# Patient Record
Sex: MALE | Race: WHITE | Employment: FULL TIME | URBAN - METROPOLITAN AREA
[De-identification: names, ages, dates, MRNs, and addresses within clinical notes are randomized per-mention and may not be internally consistent; named-entity substitution may affect disease eponyms.]

---

## 2017-02-27 ENCOUNTER — OFFICE VISIT (OUTPATIENT)
Dept: FAMILY MEDICINE CLINIC | Age: 62
End: 2017-02-27

## 2017-02-27 VITALS
BODY MASS INDEX: 25.77 KG/M2 | SYSTOLIC BLOOD PRESSURE: 116 MMHG | HEART RATE: 79 BPM | DIASTOLIC BLOOD PRESSURE: 70 MMHG | WEIGHT: 190 LBS

## 2017-02-27 DIAGNOSIS — Z78.9 STATIN INTOLERANCE: ICD-10-CM

## 2017-02-27 DIAGNOSIS — E78.2 MIXED HYPERLIPIDEMIA: Primary | ICD-10-CM

## 2017-02-27 DIAGNOSIS — E11.9 TYPE 2 DIABETES MELLITUS WITHOUT COMPLICATION, WITHOUT LONG-TERM CURRENT USE OF INSULIN (HCC): ICD-10-CM

## 2017-02-27 DIAGNOSIS — I10 ESSENTIAL HYPERTENSION: ICD-10-CM

## 2017-02-27 LAB
ALBUMIN SERPL-MCNC: 4.7 G/DL (ref 3.4–5)
ANION GAP SERPL CALCULATED.3IONS-SCNC: 19 MMOL/L (ref 3–16)
BUN BLDV-MCNC: 16 MG/DL (ref 7–20)
CALCIUM SERPL-MCNC: 9.9 MG/DL (ref 8.3–10.6)
CHLORIDE BLD-SCNC: 93 MMOL/L (ref 99–110)
CHOLESTEROL, TOTAL: 311 MG/DL (ref 0–199)
CO2: 26 MMOL/L (ref 21–32)
CREAT SERPL-MCNC: 0.9 MG/DL (ref 0.8–1.3)
GFR AFRICAN AMERICAN: >60
GFR NON-AFRICAN AMERICAN: >60
GLUCOSE BLD-MCNC: 265 MG/DL (ref 70–99)
HDLC SERPL-MCNC: 56 MG/DL (ref 40–60)
LDL CHOLESTEROL CALCULATED: 209 MG/DL
PHOSPHORUS: 3.6 MG/DL (ref 2.5–4.9)
POTASSIUM SERPL-SCNC: 4.4 MMOL/L (ref 3.5–5.1)
SODIUM BLD-SCNC: 138 MMOL/L (ref 136–145)
TRIGL SERPL-MCNC: 230 MG/DL (ref 0–150)
VLDLC SERPL CALC-MCNC: 46 MG/DL

## 2017-02-27 PROCEDURE — 99213 OFFICE O/P EST LOW 20 MIN: CPT | Performed by: FAMILY MEDICINE

## 2017-02-27 RX ORDER — GLUCOSAMINE HCL/CHONDROITIN SU 500-400 MG
CAPSULE ORAL
Qty: 100 STRIP | Refills: 5 | Status: SHIPPED | OUTPATIENT
Start: 2017-02-27 | End: 2018-06-28 | Stop reason: ALTCHOICE

## 2017-02-27 RX ORDER — PIOGLITAZONEHYDROCHLORIDE 30 MG/1
30 TABLET ORAL DAILY
Qty: 30 TABLET | Refills: 5 | Status: SHIPPED | OUTPATIENT
Start: 2017-02-27 | End: 2017-08-29 | Stop reason: SDUPTHER

## 2017-02-28 LAB
ESTIMATED AVERAGE GLUCOSE: 277.6 MG/DL
HBA1C MFR BLD: 11.3 %

## 2017-04-17 RX ORDER — RIVAROXABAN 20 MG/1
TABLET, FILM COATED ORAL
Qty: 30 TABLET | Refills: 4 | Status: SHIPPED | OUTPATIENT
Start: 2017-04-17 | End: 2017-09-18 | Stop reason: SDUPTHER

## 2017-08-29 RX ORDER — PIOGLITAZONEHYDROCHLORIDE 30 MG/1
TABLET ORAL
Qty: 30 TABLET | Refills: 0 | Status: SHIPPED | OUTPATIENT
Start: 2017-08-29 | End: 2017-09-27 | Stop reason: SDUPTHER

## 2017-09-19 RX ORDER — AMLODIPINE BESYLATE 5 MG/1
TABLET ORAL
Qty: 30 TABLET | Refills: 0 | Status: SHIPPED | OUTPATIENT
Start: 2017-09-19 | End: 2017-10-18 | Stop reason: SDUPTHER

## 2017-09-19 RX ORDER — VALSARTAN AND HYDROCHLOROTHIAZIDE 320; 25 MG/1; MG/1
TABLET, FILM COATED ORAL
Qty: 30 TABLET | Refills: 0 | Status: SHIPPED | OUTPATIENT
Start: 2017-09-19 | End: 2017-10-18 | Stop reason: SDUPTHER

## 2017-09-19 RX ORDER — RIVAROXABAN 20 MG/1
TABLET, FILM COATED ORAL
Qty: 30 TABLET | Refills: 0 | Status: SHIPPED | OUTPATIENT
Start: 2017-09-19 | End: 2017-10-18 | Stop reason: SDUPTHER

## 2017-09-27 RX ORDER — PIOGLITAZONEHYDROCHLORIDE 30 MG/1
TABLET ORAL
Qty: 30 TABLET | Refills: 0 | Status: SHIPPED | OUTPATIENT
Start: 2017-09-27 | End: 2017-10-27 | Stop reason: SDUPTHER

## 2017-10-18 RX ORDER — RIVAROXABAN 20 MG/1
TABLET, FILM COATED ORAL
Qty: 30 TABLET | Refills: 0 | Status: SHIPPED | OUTPATIENT
Start: 2017-10-18 | End: 2017-11-16 | Stop reason: SDUPTHER

## 2017-10-18 RX ORDER — AMLODIPINE BESYLATE 5 MG/1
TABLET ORAL
Qty: 30 TABLET | Refills: 0 | Status: SHIPPED | OUTPATIENT
Start: 2017-10-18 | End: 2017-11-16 | Stop reason: SDUPTHER

## 2017-10-18 RX ORDER — VALSARTAN AND HYDROCHLOROTHIAZIDE 320; 25 MG/1; MG/1
TABLET, FILM COATED ORAL
Qty: 30 TABLET | Refills: 0 | Status: SHIPPED | OUTPATIENT
Start: 2017-10-18 | End: 2017-11-16 | Stop reason: SDUPTHER

## 2017-10-23 ENCOUNTER — OFFICE VISIT (OUTPATIENT)
Dept: FAMILY MEDICINE CLINIC | Age: 62
End: 2017-10-23

## 2017-10-23 VITALS
WEIGHT: 200 LBS | SYSTOLIC BLOOD PRESSURE: 132 MMHG | OXYGEN SATURATION: 98 % | HEIGHT: 72 IN | RESPIRATION RATE: 14 BRPM | HEART RATE: 80 BPM | BODY MASS INDEX: 27.09 KG/M2 | DIASTOLIC BLOOD PRESSURE: 70 MMHG

## 2017-10-23 DIAGNOSIS — Z11.59 NEED FOR HEPATITIS C SCREENING TEST: ICD-10-CM

## 2017-10-23 DIAGNOSIS — Z23 NEED FOR PROPHYLACTIC VACCINATION AGAINST STREPTOCOCCUS PNEUMONIAE (PNEUMOCOCCUS): ICD-10-CM

## 2017-10-23 DIAGNOSIS — E11.9 TYPE 2 DIABETES MELLITUS WITHOUT COMPLICATION, WITHOUT LONG-TERM CURRENT USE OF INSULIN (HCC): Primary | ICD-10-CM

## 2017-10-23 DIAGNOSIS — Z11.4 SCREENING FOR HIV WITHOUT PRESENCE OF RISK FACTORS: ICD-10-CM

## 2017-10-23 PROCEDURE — 99213 OFFICE O/P EST LOW 20 MIN: CPT | Performed by: FAMILY MEDICINE

## 2017-10-23 ASSESSMENT — PATIENT HEALTH QUESTIONNAIRE - PHQ9
2. FEELING DOWN, DEPRESSED OR HOPELESS: 0
SUM OF ALL RESPONSES TO PHQ QUESTIONS 1-9: 0
1. LITTLE INTEREST OR PLEASURE IN DOING THINGS: 0
SUM OF ALL RESPONSES TO PHQ9 QUESTIONS 1 & 2: 0

## 2017-10-23 ASSESSMENT — ENCOUNTER SYMPTOMS
GASTROINTESTINAL NEGATIVE: 1
RESPIRATORY NEGATIVE: 1

## 2017-10-23 NOTE — PROGRESS NOTES
Subjective:      Patient ID: Navya Cordero is a 62 y.o. male. [id number] through the day, has stopped drinking sodas and more salads. Still stress eats and is under a lot of stress due to his son with MD.    Wants to be a , has had no hypo reactions      Review of Systems   Eyes: Negative for visual disturbance (needs to see ophth). Respiratory: Negative. Cardiovascular: Negative. Gastrointestinal: Negative. Objective:   Physical Exam   Constitutional: He appears well-developed and well-nourished. Neck: Normal range of motion. Neck supple. Carotid bruit is not present. No thyromegaly present. Cardiovascular: Normal rate, regular rhythm and normal heart sounds. No murmur heard. Pulses:       Dorsalis pedis pulses are 0 on the right side, and 1+ on the left side. Posterior tibial pulses are 0 on the right side, and 0 on the left side. Pulmonary/Chest: Effort normal and breath sounds normal.   Abdominal: Soft. Bowel sounds are normal. He exhibits no abdominal bruit. There is no hepatosplenomegaly. There is no tenderness. Musculoskeletal: He exhibits no edema. Lymphadenopathy:     He has no cervical adenopathy. Neurological: No sensory deficit.        Assessment:    DM needs sl better control  PVD    hyperlipidemia      Plan:      Fasting labs  Increase metformin to twice daily  3 mos

## 2017-10-27 RX ORDER — PIOGLITAZONEHYDROCHLORIDE 30 MG/1
TABLET ORAL
Qty: 30 TABLET | Refills: 5 | Status: SHIPPED | OUTPATIENT
Start: 2017-10-27 | End: 2018-04-24 | Stop reason: SDUPTHER

## 2017-11-16 DIAGNOSIS — E11.9 TYPE 2 DIABETES MELLITUS WITHOUT COMPLICATION, WITHOUT LONG-TERM CURRENT USE OF INSULIN (HCC): ICD-10-CM

## 2017-11-16 DIAGNOSIS — E78.2 MIXED HYPERLIPIDEMIA: Primary | ICD-10-CM

## 2017-11-16 DIAGNOSIS — I10 ESSENTIAL HYPERTENSION: ICD-10-CM

## 2017-11-16 RX ORDER — VALSARTAN AND HYDROCHLOROTHIAZIDE 320; 25 MG/1; MG/1
TABLET, FILM COATED ORAL
Qty: 30 TABLET | Refills: 0 | Status: SHIPPED | OUTPATIENT
Start: 2017-11-16 | End: 2017-12-18 | Stop reason: SDUPTHER

## 2017-11-16 RX ORDER — RIVAROXABAN 20 MG/1
TABLET, FILM COATED ORAL
Qty: 30 TABLET | Refills: 0 | Status: SHIPPED | OUTPATIENT
Start: 2017-11-16 | End: 2017-12-18 | Stop reason: SDUPTHER

## 2017-11-16 RX ORDER — AMLODIPINE BESYLATE 5 MG/1
TABLET ORAL
Qty: 30 TABLET | Refills: 0 | Status: SHIPPED | OUTPATIENT
Start: 2017-11-16 | End: 2017-12-18 | Stop reason: SDUPTHER

## 2017-11-16 NOTE — TELEPHONE ENCOUNTER
Pt was seen on 10.23.17, it was documented for Fasting labs to be done, they were not ordered at that time. Please place order.  Pt also needs Hgb A1C done last one in Feb 17 was 11.3

## 2017-12-01 LAB
ALBUMIN SERPL-MCNC: 4.4 G/DL (ref 3.4–5)
ANION GAP SERPL CALCULATED.3IONS-SCNC: 17 MMOL/L (ref 3–16)
BUN BLDV-MCNC: 20 MG/DL (ref 7–20)
CALCIUM SERPL-MCNC: 9.7 MG/DL (ref 8.3–10.6)
CHLORIDE BLD-SCNC: 97 MMOL/L (ref 99–110)
CHOLESTEROL, TOTAL: 274 MG/DL (ref 0–199)
CO2: 25 MMOL/L (ref 21–32)
CREAT SERPL-MCNC: 1 MG/DL (ref 0.8–1.3)
GFR AFRICAN AMERICAN: >60
GFR NON-AFRICAN AMERICAN: >60
GLUCOSE BLD-MCNC: 185 MG/DL (ref 70–99)
HDLC SERPL-MCNC: 53 MG/DL (ref 40–60)
LDL CHOLESTEROL CALCULATED: 180 MG/DL
PHOSPHORUS: 3.4 MG/DL (ref 2.5–4.9)
POTASSIUM SERPL-SCNC: 4.2 MMOL/L (ref 3.5–5.1)
SODIUM BLD-SCNC: 139 MMOL/L (ref 136–145)
TRIGL SERPL-MCNC: 207 MG/DL (ref 0–150)
VLDLC SERPL CALC-MCNC: 41 MG/DL

## 2017-12-02 LAB
ESTIMATED AVERAGE GLUCOSE: 168.6 MG/DL
HBA1C MFR BLD: 7.5 %

## 2017-12-06 RX ORDER — COLESEVELAM 180 1/1
1250 TABLET ORAL 2 TIMES DAILY WITH MEALS
Qty: 180 TABLET | Refills: 5 | Status: SHIPPED | OUTPATIENT
Start: 2017-12-06 | End: 2017-12-08 | Stop reason: SDUPTHER

## 2017-12-07 ENCOUNTER — TELEPHONE (OUTPATIENT)
Dept: FAMILY MEDICINE CLINIC | Age: 62
End: 2017-12-07

## 2017-12-08 RX ORDER — COLESEVELAM 180 1/1
1250 TABLET ORAL 2 TIMES DAILY WITH MEALS
Qty: 360 TABLET | Refills: 3 | Status: SHIPPED | OUTPATIENT
Start: 2017-12-08 | End: 2019-02-20

## 2017-12-18 RX ORDER — AMLODIPINE BESYLATE 5 MG/1
5 TABLET ORAL DAILY
Qty: 30 TABLET | Refills: 3 | Status: SHIPPED | OUTPATIENT
Start: 2017-12-18 | End: 2018-06-28 | Stop reason: SDUPTHER

## 2017-12-18 RX ORDER — VALSARTAN AND HYDROCHLOROTHIAZIDE 320; 25 MG/1; MG/1
1 TABLET, FILM COATED ORAL DAILY
Qty: 30 TABLET | Refills: 3 | Status: SHIPPED | OUTPATIENT
Start: 2017-12-18 | End: 2018-04-16 | Stop reason: SDUPTHER

## 2017-12-20 RX ORDER — AMLODIPINE BESYLATE 5 MG/1
TABLET ORAL
Qty: 30 TABLET | Refills: 5 | Status: SHIPPED | OUTPATIENT
Start: 2017-12-20 | End: 2018-06-28 | Stop reason: SDUPTHER

## 2018-04-16 RX ORDER — VALSARTAN AND HYDROCHLOROTHIAZIDE 320; 25 MG/1; MG/1
TABLET, FILM COATED ORAL
Qty: 30 TABLET | Refills: 0 | Status: SHIPPED | OUTPATIENT
Start: 2018-04-16 | End: 2018-05-15 | Stop reason: SDUPTHER

## 2018-04-17 RX ORDER — RIVAROXABAN 20 MG/1
TABLET, FILM COATED ORAL
Qty: 30 TABLET | Refills: 0 | Status: SHIPPED | OUTPATIENT
Start: 2018-04-17 | End: 2018-05-17 | Stop reason: SDUPTHER

## 2018-04-24 RX ORDER — PIOGLITAZONEHYDROCHLORIDE 30 MG/1
TABLET ORAL
Qty: 30 TABLET | Refills: 0 | Status: SHIPPED | OUTPATIENT
Start: 2018-04-24 | End: 2018-05-22 | Stop reason: SDUPTHER

## 2018-05-15 RX ORDER — VALSARTAN AND HYDROCHLOROTHIAZIDE 320; 25 MG/1; MG/1
TABLET, FILM COATED ORAL
Qty: 30 TABLET | Refills: 0 | Status: SHIPPED | OUTPATIENT
Start: 2018-05-15 | End: 2018-06-15 | Stop reason: SDUPTHER

## 2018-05-18 RX ORDER — RIVAROXABAN 20 MG/1
TABLET, FILM COATED ORAL
Qty: 30 TABLET | Refills: 0 | Status: SHIPPED | OUTPATIENT
Start: 2018-05-18 | End: 2018-06-15 | Stop reason: SDUPTHER

## 2018-06-15 RX ORDER — VALSARTAN AND HYDROCHLOROTHIAZIDE 320; 25 MG/1; MG/1
TABLET, FILM COATED ORAL
Qty: 30 TABLET | Refills: 0 | Status: SHIPPED | OUTPATIENT
Start: 2018-06-15 | End: 2018-07-15 | Stop reason: SDUPTHER

## 2018-06-28 ENCOUNTER — OFFICE VISIT (OUTPATIENT)
Dept: FAMILY MEDICINE CLINIC | Age: 63
End: 2018-06-28

## 2018-06-28 VITALS
BODY MASS INDEX: 26.85 KG/M2 | DIASTOLIC BLOOD PRESSURE: 78 MMHG | HEART RATE: 83 BPM | SYSTOLIC BLOOD PRESSURE: 130 MMHG | WEIGHT: 198 LBS

## 2018-06-28 DIAGNOSIS — E78.2 MIXED HYPERLIPIDEMIA: ICD-10-CM

## 2018-06-28 DIAGNOSIS — E11.9 TYPE 2 DIABETES MELLITUS WITHOUT COMPLICATION, WITHOUT LONG-TERM CURRENT USE OF INSULIN (HCC): Primary | ICD-10-CM

## 2018-06-28 DIAGNOSIS — Z78.9 STATIN INTOLERANCE: ICD-10-CM

## 2018-06-28 PROCEDURE — 99214 OFFICE O/P EST MOD 30 MIN: CPT | Performed by: FAMILY MEDICINE

## 2018-06-28 RX ORDER — GLUCOSAMINE HCL/CHONDROITIN SU 500-400 MG
CAPSULE ORAL
Qty: 100 STRIP | Refills: 5 | Status: SHIPPED | OUTPATIENT
Start: 2018-06-28 | End: 2019-09-23

## 2018-06-28 RX ORDER — AMLODIPINE BESYLATE 5 MG/1
5 TABLET ORAL DAILY
Qty: 30 TABLET | Refills: 5 | Status: SHIPPED | OUTPATIENT
Start: 2018-06-28 | End: 2019-01-11 | Stop reason: SDUPTHER

## 2018-07-23 RX ORDER — PIOGLITAZONEHYDROCHLORIDE 30 MG/1
TABLET ORAL
Qty: 30 TABLET | Refills: 3 | Status: SHIPPED | OUTPATIENT
Start: 2018-07-23 | End: 2018-11-19 | Stop reason: SDUPTHER

## 2018-07-24 DIAGNOSIS — E11.9 TYPE 2 DIABETES MELLITUS WITHOUT COMPLICATION, WITHOUT LONG-TERM CURRENT USE OF INSULIN (HCC): ICD-10-CM

## 2018-07-24 DIAGNOSIS — E78.2 MIXED HYPERLIPIDEMIA: ICD-10-CM

## 2018-07-24 LAB
ANION GAP SERPL CALCULATED.3IONS-SCNC: 16 MMOL/L (ref 3–16)
BUN BLDV-MCNC: 19 MG/DL (ref 7–20)
CALCIUM SERPL-MCNC: 9.2 MG/DL (ref 8.3–10.6)
CHLORIDE BLD-SCNC: 101 MMOL/L (ref 99–110)
CHOLESTEROL, TOTAL: 203 MG/DL (ref 0–199)
CO2: 24 MMOL/L (ref 21–32)
CREAT SERPL-MCNC: 1.1 MG/DL (ref 0.8–1.3)
GFR AFRICAN AMERICAN: >60
GFR NON-AFRICAN AMERICAN: >60
GLUCOSE BLD-MCNC: 156 MG/DL (ref 70–99)
HDLC SERPL-MCNC: 57 MG/DL (ref 40–60)
LDL CHOLESTEROL CALCULATED: 121 MG/DL
POTASSIUM SERPL-SCNC: 4.3 MMOL/L (ref 3.5–5.1)
SODIUM BLD-SCNC: 141 MMOL/L (ref 136–145)
TRIGL SERPL-MCNC: 127 MG/DL (ref 0–150)
VLDLC SERPL CALC-MCNC: 25 MG/DL

## 2018-07-25 LAB
ESTIMATED AVERAGE GLUCOSE: 174.3 MG/DL
HBA1C MFR BLD: 7.7 %

## 2018-11-19 RX ORDER — PIOGLITAZONEHYDROCHLORIDE 30 MG/1
TABLET ORAL
Qty: 30 TABLET | Refills: 2 | Status: SHIPPED | OUTPATIENT
Start: 2018-11-19 | End: 2019-02-17 | Stop reason: SDUPTHER

## 2018-12-06 NOTE — TELEPHONE ENCOUNTER
Pt states he received a call from his pharmacy that his prescriptionvalsartan-hydrochlorothiazide (DIOVAN-HCT) 320-25 MG per  has been recall  Please review

## 2018-12-07 RX ORDER — OLMESARTAN MEDOXOMIL AND HYDROCHLOROTHIAZIDE 40/25 40; 25 MG/1; MG/1
1 TABLET ORAL DAILY
Qty: 30 TABLET | Refills: 0 | Status: SHIPPED | OUTPATIENT
Start: 2018-12-07 | End: 2019-02-20

## 2018-12-07 NOTE — TELEPHONE ENCOUNTER
I will change him to something else.  He will need a blood pressure check in 2-3 weeks in the office with me

## 2019-02-09 RX ORDER — VALSARTAN AND HYDROCHLOROTHIAZIDE 320; 25 MG/1; MG/1
TABLET, FILM COATED ORAL
Qty: 30 TABLET | Refills: 0 | Status: SHIPPED | OUTPATIENT
Start: 2019-02-09 | End: 2019-03-12 | Stop reason: SDUPTHER

## 2019-02-09 RX ORDER — AMLODIPINE BESYLATE 5 MG/1
TABLET ORAL
Qty: 30 TABLET | Refills: 0 | Status: SHIPPED | OUTPATIENT
Start: 2019-02-09 | End: 2019-02-20

## 2019-02-11 RX ORDER — RIVAROXABAN 20 MG/1
TABLET, FILM COATED ORAL
Qty: 30 TABLET | Refills: 0 | Status: SHIPPED | OUTPATIENT
Start: 2019-02-11 | End: 2019-03-12 | Stop reason: SDUPTHER

## 2019-02-20 ENCOUNTER — OFFICE VISIT (OUTPATIENT)
Dept: FAMILY MEDICINE CLINIC | Age: 64
End: 2019-02-20
Payer: COMMERCIAL

## 2019-02-20 VITALS
DIASTOLIC BLOOD PRESSURE: 66 MMHG | HEART RATE: 64 BPM | OXYGEN SATURATION: 97 % | SYSTOLIC BLOOD PRESSURE: 132 MMHG | WEIGHT: 206.4 LBS | RESPIRATION RATE: 14 BRPM | BODY MASS INDEX: 27.99 KG/M2

## 2019-02-20 DIAGNOSIS — E55.9 VITAMIN D DEFICIENCY: ICD-10-CM

## 2019-02-20 DIAGNOSIS — Z12.5 PROSTATE CANCER SCREENING: ICD-10-CM

## 2019-02-20 DIAGNOSIS — E11.9 TYPE 2 DIABETES MELLITUS WITHOUT COMPLICATION, WITHOUT LONG-TERM CURRENT USE OF INSULIN (HCC): ICD-10-CM

## 2019-02-20 DIAGNOSIS — I10 ESSENTIAL HYPERTENSION: ICD-10-CM

## 2019-02-20 DIAGNOSIS — E78.2 MIXED HYPERLIPIDEMIA: ICD-10-CM

## 2019-02-20 DIAGNOSIS — E11.9 TYPE 2 DIABETES MELLITUS WITHOUT COMPLICATION, WITHOUT LONG-TERM CURRENT USE OF INSULIN (HCC): Primary | ICD-10-CM

## 2019-02-20 LAB
ANION GAP SERPL CALCULATED.3IONS-SCNC: 15 MMOL/L (ref 3–16)
BUN BLDV-MCNC: 23 MG/DL (ref 7–20)
CALCIUM SERPL-MCNC: 9.7 MG/DL (ref 8.3–10.6)
CHLORIDE BLD-SCNC: 100 MMOL/L (ref 99–110)
CHOLESTEROL, FASTING: 233 MG/DL (ref 0–199)
CO2: 26 MMOL/L (ref 21–32)
CREAT SERPL-MCNC: 1 MG/DL (ref 0.8–1.3)
GFR AFRICAN AMERICAN: >60
GFR NON-AFRICAN AMERICAN: >60
GLUCOSE BLD-MCNC: 131 MG/DL (ref 70–99)
HDLC SERPL-MCNC: 44 MG/DL (ref 40–60)
LDL CHOLESTEROL CALCULATED: 157 MG/DL
POTASSIUM SERPL-SCNC: 4.5 MMOL/L (ref 3.5–5.1)
PROSTATE SPECIFIC ANTIGEN: 1.49 NG/ML (ref 0–4)
SODIUM BLD-SCNC: 141 MMOL/L (ref 136–145)
TRIGLYCERIDE, FASTING: 158 MG/DL (ref 0–150)
VITAMIN D 25-HYDROXY: 29.8 NG/ML
VLDLC SERPL CALC-MCNC: 32 MG/DL

## 2019-02-20 PROCEDURE — G8427 DOCREV CUR MEDS BY ELIG CLIN: HCPCS | Performed by: FAMILY MEDICINE

## 2019-02-20 PROCEDURE — 3046F HEMOGLOBIN A1C LEVEL >9.0%: CPT | Performed by: FAMILY MEDICINE

## 2019-02-20 PROCEDURE — 3017F COLORECTAL CA SCREEN DOC REV: CPT | Performed by: FAMILY MEDICINE

## 2019-02-20 PROCEDURE — G8484 FLU IMMUNIZE NO ADMIN: HCPCS | Performed by: FAMILY MEDICINE

## 2019-02-20 PROCEDURE — 2022F DILAT RTA XM EVC RTNOPTHY: CPT | Performed by: FAMILY MEDICINE

## 2019-02-20 PROCEDURE — 99214 OFFICE O/P EST MOD 30 MIN: CPT | Performed by: FAMILY MEDICINE

## 2019-02-20 PROCEDURE — 36415 COLL VENOUS BLD VENIPUNCTURE: CPT | Performed by: FAMILY MEDICINE

## 2019-02-20 PROCEDURE — G8598 ASA/ANTIPLAT THER USED: HCPCS | Performed by: FAMILY MEDICINE

## 2019-02-20 PROCEDURE — G8419 CALC BMI OUT NRM PARAM NOF/U: HCPCS | Performed by: FAMILY MEDICINE

## 2019-02-20 PROCEDURE — 1036F TOBACCO NON-USER: CPT | Performed by: FAMILY MEDICINE

## 2019-02-20 RX ORDER — AMLODIPINE BESYLATE 10 MG/1
10 TABLET ORAL DAILY
Qty: 30 TABLET | Refills: 5 | Status: SHIPPED | OUTPATIENT
Start: 2019-02-20 | End: 2019-09-23 | Stop reason: ALTCHOICE

## 2019-02-20 ASSESSMENT — PATIENT HEALTH QUESTIONNAIRE - PHQ9
1. LITTLE INTEREST OR PLEASURE IN DOING THINGS: 0
SUM OF ALL RESPONSES TO PHQ9 QUESTIONS 1 & 2: 0
SUM OF ALL RESPONSES TO PHQ QUESTIONS 1-9: 0
2. FEELING DOWN, DEPRESSED OR HOPELESS: 0
SUM OF ALL RESPONSES TO PHQ QUESTIONS 1-9: 0

## 2019-02-21 LAB
ESTIMATED AVERAGE GLUCOSE: 177.2 MG/DL
HBA1C MFR BLD: 7.8 %

## 2019-03-06 ENCOUNTER — NURSE ONLY (OUTPATIENT)
Dept: FAMILY MEDICINE CLINIC | Age: 64
End: 2019-03-06

## 2019-03-06 VITALS — DIASTOLIC BLOOD PRESSURE: 80 MMHG | SYSTOLIC BLOOD PRESSURE: 130 MMHG

## 2019-03-12 RX ORDER — VALSARTAN AND HYDROCHLOROTHIAZIDE 320; 25 MG/1; MG/1
TABLET, FILM COATED ORAL
Qty: 30 TABLET | Refills: 2 | Status: SHIPPED | OUTPATIENT
Start: 2019-03-12 | End: 2019-06-13 | Stop reason: SDUPTHER

## 2019-03-12 RX ORDER — AMLODIPINE BESYLATE 5 MG/1
TABLET ORAL
Qty: 30 TABLET | Refills: 2 | Status: SHIPPED | OUTPATIENT
Start: 2019-03-12 | End: 2019-07-17 | Stop reason: SDUPTHER

## 2019-03-12 RX ORDER — RIVAROXABAN 20 MG/1
TABLET, FILM COATED ORAL
Qty: 30 TABLET | Refills: 2 | Status: SHIPPED | OUTPATIENT
Start: 2019-03-12 | End: 2019-06-13 | Stop reason: SDUPTHER

## 2019-06-13 RX ORDER — RIVAROXABAN 20 MG/1
TABLET, FILM COATED ORAL
Qty: 30 TABLET | Refills: 1 | Status: SHIPPED | OUTPATIENT
Start: 2019-06-13 | End: 2019-08-12 | Stop reason: SDUPTHER

## 2019-06-13 RX ORDER — VALSARTAN AND HYDROCHLOROTHIAZIDE 320; 25 MG/1; MG/1
TABLET, FILM COATED ORAL
Qty: 30 TABLET | Refills: 1 | Status: SHIPPED | OUTPATIENT
Start: 2019-06-13 | End: 2019-08-12 | Stop reason: SDUPTHER

## 2019-06-13 NOTE — TELEPHONE ENCOUNTER
Medication:   Requested Prescriptions     Pending Prescriptions Disp Refills    metFORMIN (GLUCOPHAGE) 500 MG tablet [Pharmacy Med Name: metFORMIN  MG TABLET] 90 tablet 1     Sig: TAKE TWO TABLETS BY MOUTH EVERY MORNING AND TAKE ONE TABLET BY MOUTH DAILY IN THE EVENING    valsartan-hydrochlorothiazide (DIOVAN-HCT) 320-25 MG per tablet [Pharmacy Med Name: VALSARTAN-HCTZ 320-25 MG TAB] 30 tablet 1     Sig: TAKE ONE TABLET BY MOUTH DAILY    XARELTO 20 MG TABS tablet [Pharmacy Med Name: Ana Mela 20 MG TABLET] 30 tablet 1     Sig: TAKE ONE TABLET BY MOUTH DAILY WITH BREAKFAST     Last Filled:  3/12/19    Last appt: 2/20/2019   Next appt: Visit date not found    Last Labs DM:   Lab Results   Component Value Date    LABA1C 7.8 02/20/2019     Last Lipid:   Lab Results   Component Value Date    CHOL 203 07/24/2018    TRIG 127 07/24/2018    HDL 44 02/20/2019    LDLCALC 157 02/20/2019     Last PSA:   Lab Results   Component Value Date    PSA 1.49 02/20/2019     Last Thyroid: No results found for: TSH, FT3, G4HGBJX, T4FREE, W0CZIGM

## 2019-07-17 RX ORDER — AMLODIPINE BESYLATE 5 MG/1
TABLET ORAL
Qty: 30 TABLET | Refills: 1 | Status: SHIPPED | OUTPATIENT
Start: 2019-07-17 | End: 2019-09-09 | Stop reason: SDUPTHER

## 2019-08-12 RX ORDER — VALSARTAN AND HYDROCHLOROTHIAZIDE 320; 25 MG/1; MG/1
TABLET, FILM COATED ORAL
Qty: 30 TABLET | Refills: 0 | Status: SHIPPED | OUTPATIENT
Start: 2019-08-12 | End: 2019-09-09 | Stop reason: SDUPTHER

## 2019-08-12 RX ORDER — RIVAROXABAN 20 MG/1
TABLET, FILM COATED ORAL
Qty: 30 TABLET | Refills: 5 | Status: SHIPPED | OUTPATIENT
Start: 2019-08-12 | End: 2019-09-23 | Stop reason: SDUPTHER

## 2019-08-12 NOTE — TELEPHONE ENCOUNTER
Medication:   Requested Prescriptions     Pending Prescriptions Disp Refills    valsartan-hydrochlorothiazide (DIOVAN-HCT) 320-25 MG per tablet [Pharmacy Med Name: VALSARTAN-HCTZ 320-25 MG TAB] 30 tablet 0     Sig: TAKE ONE TABLET BY MOUTH DAILY    XARELTO 20 MG TABS tablet [Pharmacy Med Name: Leim Mensah 20 MG TABLET] 30 tablet 0     Sig: TAKE ONE TABLET BY MOUTH DAILY WITH BREAKFAST    metFORMIN (GLUCOPHAGE) 500 MG tablet [Pharmacy Med Name: metFORMIN  MG TABLET] 90 tablet 0     Sig: TAKE TWO TABLETS BY MOUTH EVERY MORNING AND TAKE ONE TABLET BY MOUTH DAILY IN THE EVENING     Last Filled:  7.12.19    Last appt: 2/20/2019   Next appt: Visit date not found    Last Labs DM:   Lab Results   Component Value Date    LABA1C 7.8 02/20/2019

## 2019-08-16 RX ORDER — PIOGLITAZONEHYDROCHLORIDE 30 MG/1
TABLET ORAL
Qty: 30 TABLET | Refills: 0 | Status: SHIPPED | OUTPATIENT
Start: 2019-08-16 | End: 2019-08-20 | Stop reason: SDUPTHER

## 2019-08-20 RX ORDER — PIOGLITAZONEHYDROCHLORIDE 30 MG/1
TABLET ORAL
Qty: 30 TABLET | Refills: 0 | Status: SHIPPED | OUTPATIENT
Start: 2019-08-20 | End: 2019-09-23 | Stop reason: SDUPTHER

## 2019-09-10 RX ORDER — AMLODIPINE BESYLATE 5 MG/1
TABLET ORAL
Qty: 30 TABLET | Refills: 0 | Status: SHIPPED | OUTPATIENT
Start: 2019-09-10 | End: 2019-09-23 | Stop reason: SDUPTHER

## 2019-09-10 RX ORDER — VALSARTAN AND HYDROCHLOROTHIAZIDE 320; 25 MG/1; MG/1
TABLET, FILM COATED ORAL
Qty: 30 TABLET | Refills: 0 | Status: SHIPPED | OUTPATIENT
Start: 2019-09-10 | End: 2019-09-23 | Stop reason: SDUPTHER

## 2019-09-10 NOTE — TELEPHONE ENCOUNTER
Medication:   Requested Prescriptions     Pending Prescriptions Disp Refills    amLODIPine (NORVASC) 5 MG tablet [Pharmacy Med Name: amLODIPine BESYLATE 5 MG TAB] 30 tablet 0     Sig: TAKE ONE TABLET BY MOUTH DAILY    valsartan-hydrochlorothiazide (DIOVAN-HCT) 320-25 MG per tablet [Pharmacy Med Name: Phil Everts 320-25 MG TAB] 30 tablet 0     Sig: TAKE ONE TABLET BY MOUTH DAILY    metFORMIN (GLUCOPHAGE) 500 MG tablet [Pharmacy Med Name: metFORMIN  MG TABLET] 90 tablet 0     Sig: TAKE TWO TABLETS BY MOUTH EVERY MORNING AND TAKE ONE TABLET BY MOUTH EVERY EVENING       Last Filled:      Patient Phone Number: [phone] (home)     Last appt: 6/28/2018   Next appt: Visit date not found  09-    Last Labs DM:   Lab Results   Component Value Date    LABA1C 7.8 02/20/2019       Last OARRS: No flowsheet data found.     Preferred Pharmacy:   [facility] - [address] [phone] Shan Clock [phone]  [address] [place] [address]  Phone: [phone] Fax: [phone]    [address] [phone] Shan Clock [phone]  [address] [place] [id number]  Phone: [phone] Fax: [phone]

## 2019-09-23 ENCOUNTER — OFFICE VISIT (OUTPATIENT)
Dept: PRIMARY CARE CLINIC | Age: 64
End: 2019-09-23
Payer: COMMERCIAL

## 2019-09-23 VITALS
WEIGHT: 197 LBS | DIASTOLIC BLOOD PRESSURE: 86 MMHG | HEART RATE: 97 BPM | BODY MASS INDEX: 26.72 KG/M2 | SYSTOLIC BLOOD PRESSURE: 130 MMHG

## 2019-09-23 DIAGNOSIS — E11.9 TYPE 2 DIABETES MELLITUS WITHOUT COMPLICATION, WITHOUT LONG-TERM CURRENT USE OF INSULIN (HCC): Primary | ICD-10-CM

## 2019-09-23 LAB — HBA1C MFR BLD: 7.2 %

## 2019-09-23 PROCEDURE — 3045F PR MOST RECENT HEMOGLOBIN A1C LEVEL 7.0-9.0%: CPT | Performed by: FAMILY MEDICINE

## 2019-09-23 PROCEDURE — 2022F DILAT RTA XM EVC RTNOPTHY: CPT | Performed by: FAMILY MEDICINE

## 2019-09-23 PROCEDURE — G8419 CALC BMI OUT NRM PARAM NOF/U: HCPCS | Performed by: FAMILY MEDICINE

## 2019-09-23 PROCEDURE — 99214 OFFICE O/P EST MOD 30 MIN: CPT | Performed by: FAMILY MEDICINE

## 2019-09-23 PROCEDURE — 1036F TOBACCO NON-USER: CPT | Performed by: FAMILY MEDICINE

## 2019-09-23 PROCEDURE — 83036 HEMOGLOBIN GLYCOSYLATED A1C: CPT | Performed by: FAMILY MEDICINE

## 2019-09-23 PROCEDURE — G8427 DOCREV CUR MEDS BY ELIG CLIN: HCPCS | Performed by: FAMILY MEDICINE

## 2019-09-23 PROCEDURE — G8598 ASA/ANTIPLAT THER USED: HCPCS | Performed by: FAMILY MEDICINE

## 2019-09-23 PROCEDURE — 3017F COLORECTAL CA SCREEN DOC REV: CPT | Performed by: FAMILY MEDICINE

## 2019-09-23 RX ORDER — AMLODIPINE BESYLATE 5 MG/1
5 TABLET ORAL DAILY
Qty: 30 TABLET | Refills: 5 | Status: SHIPPED | OUTPATIENT
Start: 2019-09-23 | End: 2020-04-13

## 2019-09-23 RX ORDER — PIOGLITAZONEHYDROCHLORIDE 30 MG/1
30 TABLET ORAL DAILY
Qty: 30 TABLET | Refills: 5 | Status: SHIPPED | OUTPATIENT
Start: 2019-09-23 | End: 2020-04-07

## 2019-09-23 RX ORDER — VALSARTAN AND HYDROCHLOROTHIAZIDE 320; 25 MG/1; MG/1
TABLET, FILM COATED ORAL
Qty: 30 TABLET | Refills: 5 | Status: SHIPPED | OUTPATIENT
Start: 2019-09-23 | End: 2020-04-07

## 2019-09-23 ASSESSMENT — ENCOUNTER SYMPTOMS: BACK PAIN: 1

## 2019-10-04 DIAGNOSIS — E11.9 TYPE 2 DIABETES MELLITUS WITHOUT COMPLICATION, WITHOUT LONG-TERM CURRENT USE OF INSULIN (HCC): ICD-10-CM

## 2019-10-04 LAB
CHOLESTEROL, TOTAL: 215 MG/DL (ref 0–199)
HDLC SERPL-MCNC: 62 MG/DL (ref 40–60)
LDL CHOLESTEROL CALCULATED: 124 MG/DL
TRIGL SERPL-MCNC: 143 MG/DL (ref 0–150)
VLDLC SERPL CALC-MCNC: 29 MG/DL

## 2019-12-11 ENCOUNTER — OFFICE VISIT (OUTPATIENT)
Dept: PRIMARY CARE CLINIC | Age: 64
End: 2019-12-11
Payer: COMMERCIAL

## 2019-12-11 VITALS
TEMPERATURE: 98.3 F | HEART RATE: 84 BPM | DIASTOLIC BLOOD PRESSURE: 80 MMHG | BODY MASS INDEX: 26.55 KG/M2 | OXYGEN SATURATION: 98 % | HEIGHT: 72 IN | WEIGHT: 196 LBS | SYSTOLIC BLOOD PRESSURE: 120 MMHG

## 2019-12-11 DIAGNOSIS — J40 BRONCHITIS: Primary | ICD-10-CM

## 2019-12-11 PROCEDURE — G8598 ASA/ANTIPLAT THER USED: HCPCS | Performed by: FAMILY MEDICINE

## 2019-12-11 PROCEDURE — G8484 FLU IMMUNIZE NO ADMIN: HCPCS | Performed by: FAMILY MEDICINE

## 2019-12-11 PROCEDURE — G8427 DOCREV CUR MEDS BY ELIG CLIN: HCPCS | Performed by: FAMILY MEDICINE

## 2019-12-11 PROCEDURE — 99213 OFFICE O/P EST LOW 20 MIN: CPT | Performed by: FAMILY MEDICINE

## 2019-12-11 PROCEDURE — 1036F TOBACCO NON-USER: CPT | Performed by: FAMILY MEDICINE

## 2019-12-11 PROCEDURE — G8419 CALC BMI OUT NRM PARAM NOF/U: HCPCS | Performed by: FAMILY MEDICINE

## 2019-12-11 PROCEDURE — 3017F COLORECTAL CA SCREEN DOC REV: CPT | Performed by: FAMILY MEDICINE

## 2019-12-11 RX ORDER — BENZONATATE 200 MG/1
200 CAPSULE ORAL 3 TIMES DAILY PRN
Qty: 30 CAPSULE | Refills: 0 | Status: SHIPPED | OUTPATIENT
Start: 2019-12-11 | End: 2019-12-24 | Stop reason: SDUPTHER

## 2019-12-11 RX ORDER — AZITHROMYCIN 250 MG/1
250 TABLET, FILM COATED ORAL SEE ADMIN INSTRUCTIONS
Qty: 6 TABLET | Refills: 0 | Status: SHIPPED | OUTPATIENT
Start: 2019-12-11 | End: 2019-12-16

## 2019-12-11 RX ORDER — SILDENAFIL 100 MG/1
100 TABLET, FILM COATED ORAL DAILY PRN
Qty: 30 TABLET | Refills: 5 | Status: SHIPPED | OUTPATIENT
Start: 2019-12-11 | End: 2020-09-10 | Stop reason: SDUPTHER

## 2019-12-11 ASSESSMENT — PATIENT HEALTH QUESTIONNAIRE - PHQ9
1. LITTLE INTEREST OR PLEASURE IN DOING THINGS: 0
SUM OF ALL RESPONSES TO PHQ9 QUESTIONS 1 & 2: 0
2. FEELING DOWN, DEPRESSED OR HOPELESS: 0
SUM OF ALL RESPONSES TO PHQ QUESTIONS 1-9: 0
SUM OF ALL RESPONSES TO PHQ QUESTIONS 1-9: 0

## 2019-12-16 ENCOUNTER — TELEPHONE (OUTPATIENT)
Dept: PRIMARY CARE CLINIC | Age: 64
End: 2019-12-16

## 2019-12-16 ENCOUNTER — HOSPITAL ENCOUNTER (OUTPATIENT)
Age: 64
Discharge: HOME | End: 2019-12-16
Payer: COMMERCIAL

## 2019-12-16 ENCOUNTER — HOSPITAL ENCOUNTER (OUTPATIENT)
Dept: GENERAL RADIOLOGY | Age: 64
Discharge: HOME | End: 2019-12-16
Payer: COMMERCIAL

## 2019-12-16 DIAGNOSIS — R05.3 PERSISTENT COUGH: Primary | ICD-10-CM

## 2019-12-16 DIAGNOSIS — I71.20 THORACIC AORTIC ANEURYSM WITHOUT RUPTURE: Primary | ICD-10-CM

## 2019-12-16 DIAGNOSIS — R05.3 PERSISTENT COUGH: ICD-10-CM

## 2019-12-16 PROCEDURE — 71046 X-RAY EXAM CHEST 2 VIEWS: CPT

## 2019-12-16 RX ORDER — CEFPROZIL 500 MG/1
500 TABLET, FILM COATED ORAL 2 TIMES DAILY
Qty: 20 TABLET | Refills: 0 | Status: SHIPPED | OUTPATIENT
Start: 2019-12-16 | End: 2019-12-26

## 2019-12-23 ENCOUNTER — HOSPITAL ENCOUNTER (OUTPATIENT)
Dept: CT IMAGING | Age: 64
Discharge: HOME | End: 2019-12-23
Payer: COMMERCIAL

## 2019-12-23 DIAGNOSIS — I71.20 THORACIC AORTIC ANEURYSM WITHOUT RUPTURE: ICD-10-CM

## 2019-12-23 PROCEDURE — 71250 CT THORAX DX C-: CPT

## 2019-12-24 RX ORDER — BENZONATATE 200 MG/1
200 CAPSULE ORAL 3 TIMES DAILY PRN
Qty: 30 CAPSULE | Refills: 0 | Status: SHIPPED | OUTPATIENT
Start: 2019-12-24 | End: 2020-01-10 | Stop reason: SDUPTHER

## 2019-12-27 ENCOUNTER — OFFICE VISIT (OUTPATIENT)
Dept: PRIMARY CARE CLINIC | Age: 64
End: 2019-12-27
Payer: COMMERCIAL

## 2019-12-27 VITALS
RESPIRATION RATE: 12 BRPM | SYSTOLIC BLOOD PRESSURE: 132 MMHG | DIASTOLIC BLOOD PRESSURE: 74 MMHG | BODY MASS INDEX: 26.77 KG/M2 | TEMPERATURE: 97.8 F | WEIGHT: 197.4 LBS | HEART RATE: 87 BPM | OXYGEN SATURATION: 99 %

## 2019-12-27 DIAGNOSIS — R05.3 PERSISTENT COUGH: Primary | ICD-10-CM

## 2019-12-27 PROCEDURE — G8484 FLU IMMUNIZE NO ADMIN: HCPCS | Performed by: FAMILY MEDICINE

## 2019-12-27 PROCEDURE — 1036F TOBACCO NON-USER: CPT | Performed by: FAMILY MEDICINE

## 2019-12-27 PROCEDURE — G8598 ASA/ANTIPLAT THER USED: HCPCS | Performed by: FAMILY MEDICINE

## 2019-12-27 PROCEDURE — 99213 OFFICE O/P EST LOW 20 MIN: CPT | Performed by: FAMILY MEDICINE

## 2019-12-27 PROCEDURE — G8419 CALC BMI OUT NRM PARAM NOF/U: HCPCS | Performed by: FAMILY MEDICINE

## 2019-12-27 PROCEDURE — G8427 DOCREV CUR MEDS BY ELIG CLIN: HCPCS | Performed by: FAMILY MEDICINE

## 2019-12-27 PROCEDURE — 3017F COLORECTAL CA SCREEN DOC REV: CPT | Performed by: FAMILY MEDICINE

## 2019-12-27 RX ORDER — PREDNISONE 20 MG/1
40 TABLET ORAL DAILY
Qty: 10 TABLET | Refills: 0 | Status: SHIPPED | OUTPATIENT
Start: 2019-12-27 | End: 2020-01-01

## 2020-01-07 ENCOUNTER — OFFICE VISIT (OUTPATIENT)
Dept: ENT CLINIC | Age: 65
End: 2020-01-07
Payer: COMMERCIAL

## 2020-01-07 ENCOUNTER — TELEPHONE (OUTPATIENT)
Dept: ENT CLINIC | Age: 65
End: 2020-01-07

## 2020-01-07 VITALS
DIASTOLIC BLOOD PRESSURE: 80 MMHG | BODY MASS INDEX: 26.68 KG/M2 | SYSTOLIC BLOOD PRESSURE: 127 MMHG | HEIGHT: 72 IN | WEIGHT: 197 LBS | HEART RATE: 77 BPM | TEMPERATURE: 97.4 F

## 2020-01-07 PROCEDURE — 31575 DIAGNOSTIC LARYNGOSCOPY: CPT | Performed by: OTOLARYNGOLOGY

## 2020-01-07 PROCEDURE — G8484 FLU IMMUNIZE NO ADMIN: HCPCS | Performed by: OTOLARYNGOLOGY

## 2020-01-07 PROCEDURE — G8419 CALC BMI OUT NRM PARAM NOF/U: HCPCS | Performed by: OTOLARYNGOLOGY

## 2020-01-07 PROCEDURE — 99203 OFFICE O/P NEW LOW 30 MIN: CPT | Performed by: OTOLARYNGOLOGY

## 2020-01-07 PROCEDURE — G8427 DOCREV CUR MEDS BY ELIG CLIN: HCPCS | Performed by: OTOLARYNGOLOGY

## 2020-01-07 ASSESSMENT — ENCOUNTER SYMPTOMS
DIARRHEA: 0
SINUS PAIN: 0
SORE THROAT: 0
NAUSEA: 0
CHOKING: 0
SINUS PRESSURE: 0
WHEEZING: 0
RHINORRHEA: 0
COUGH: 1
BACK PAIN: 0
CONSTIPATION: 0
VOICE CHANGE: 0
FACIAL SWELLING: 0
COLOR CHANGE: 0
VOMITING: 0
EYE DISCHARGE: 0
BLOOD IN STOOL: 0
PHOTOPHOBIA: 0
SHORTNESS OF BREATH: 0
STRIDOR: 0
TROUBLE SWALLOWING: 0
EYE ITCHING: 0

## 2020-01-07 NOTE — PROGRESS NOTES
[facility]  [facility], [address] [place]  P: [phone]  F: [phone]       Patient     Nory Hart  1955    ChiefComplaint     Chief Complaint   Patient presents with    Cough     Patient is here today because he has a sensation that he has a phlem ball stuck in his throat on the right side which makes him cough, no discomfort when he swallows or pain when he swallows, onset Thanksgiving       History of Present Illness     Tan Chapman is a pleasant 80-year-old male who presents as a new patient today for globus sensation and cough. This is been going on since Thanksgiving. It started with upper respiratory infection. He was placed on a antibiotic and a steroid. He continues to have a cough throughout the day and nighttime. Whenever he swallows he feels the foreign body sensation in his throat. Denies change in voice, hemoptysis. Chest x-ray and chest CT were normal.  He is not taking an ACE inhibitor. He denies any sinusitis symptoms. Denies any reflux symptoms.     Past Medical History     Past Medical History:   Diagnosis Date    Cerebral palsy ([address]     DVT (deep venous thrombosis) ([address] 2012    L    DVT (deep venous thrombosis) ([address] 2011    R    Hypertension     Statin intolerance 2/27/2017    Type 2 diabetes mellitus without complication, without long-term current use of insulin ([address] 10/23/2017       Past Surgical History     Past Surgical History:   Procedure Laterality Date    CARDIOVASCULAR STRESS TEST  may 2011    normal    CARPAL TUNNEL RELEASE      FOOT SURGERY      club foot, right foot, 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 Hair is normal.      Jaw: No trismus. Right Ear: Hearing, tympanic membrane and external ear normal. No decreased hearing noted. No drainage, swelling or tenderness. No middle ear effusion. No mastoid tenderness. Tympanic membrane is not perforated, retracted or bulging. Tympanic membrane has normal mobility. Left Ear: Hearing, tympanic membrane and external ear normal. No decreased hearing noted. No drainage, swelling or tenderness. No middle ear effusion. No mastoid tenderness. Tympanic membrane is not perforated, retracted or bulging. Tympanic membrane has normal mobility. Nose: No nasal deformity, septal deviation, laceration, mucosal edema or rhinorrhea. Right Sinus: No maxillary sinus tenderness or frontal sinus tenderness. Left Sinus: No maxillary sinus tenderness or frontal sinus tenderness. Mouth/Throat:      Mouth: Mucous membranes are not pale, not dry and not cyanotic. No lacerations or oral lesions. Dentition: Normal dentition. No dental caries or dental abscesses. Pharynx: Uvula midline. No oropharyngeal exudate, posterior oropharyngeal erythema or uvula swelling. Tonsils: No tonsillar abscesses. Eyes:      General: Lids are normal.         Right eye: No discharge. Left eye: No discharge. Extraocular Movements:      Right eye: Normal extraocular motion and no nystagmus. Left eye: Normal extraocular motion and no nystagmus. Conjunctiva/sclera:      Right eye: No chemosis or exudate. Left eye: No chemosis or exudate. Neck:      Musculoskeletal: Neck supple. Thyroid: No thyroid mass or thyromegaly. Vascular: Normal carotid pulses. Trachea: No tracheal tenderness or tracheal deviation. Comments: Slightly prominent submandibular and parotid glands bilaterally.   Foreign body sensation inferior and posterior to right submandibular exam findings are normal today. He does have some foreign body sensation upon palpation and posterior inferior to the right submandibular gland. There is no correlating exam findings to this. I recommend a CT soft tissue neck to evaluate the neck soft tissues. We will see him back to go over the results. - CT SOFT TISSUE NECK W CONTRAST; Future    2. Globus sensation    - CT SOFT TISSUE NECK W CONTRAST; Future      Return for after CT. Portions of this note were dictated using Dragon.  There may be linguistic errors secondary to the use of this program.

## 2020-01-09 ENCOUNTER — TELEPHONE (OUTPATIENT)
Dept: PRIMARY CARE CLINIC | Age: 65
End: 2020-01-09

## 2020-01-10 ENCOUNTER — HOSPITAL ENCOUNTER (OUTPATIENT)
Dept: CT IMAGING | Age: 65
Discharge: HOME | End: 2020-01-10
Payer: COMMERCIAL

## 2020-01-10 LAB
GFR AFRICAN AMERICAN: >60
GFR NON-AFRICAN AMERICAN: >60
PERFORMED ON: NORMAL
POC CREATININE: 1.2 MG/DL (ref 0.8–1.3)
POC SAMPLE TYPE: NORMAL

## 2020-01-10 PROCEDURE — 82565 ASSAY OF CREATININE: CPT

## 2020-01-10 PROCEDURE — 70491 CT SOFT TISSUE NECK W/DYE: CPT

## 2020-01-10 PROCEDURE — 6360000004 HC RX CONTRAST MEDICATION: Performed by: OTOLARYNGOLOGY

## 2020-01-10 RX ORDER — BENZONATATE 200 MG/1
200 CAPSULE ORAL 3 TIMES DAILY PRN
Qty: 30 CAPSULE | Refills: 0 | OUTPATIENT
Start: 2020-01-10 | End: 2020-01-20

## 2020-01-10 RX ADMIN — IOPAMIDOL 80 ML: 755 INJECTION, SOLUTION INTRAVENOUS at 08:28

## 2020-01-13 ENCOUNTER — TELEPHONE (OUTPATIENT)
Dept: ENT CLINIC | Age: 65
End: 2020-01-13

## 2020-01-13 NOTE — TELEPHONE ENCOUNTER
Calling to get results of CT scan done on 1-10-20. Patient informed Dr. Judy Le is not in the office today and he will be in tomorrow.   francisco

## 2020-01-14 ENCOUNTER — TELEPHONE (OUTPATIENT)
Dept: PRIMARY CARE CLINIC | Age: 65
End: 2020-01-14

## 2020-01-15 RX ORDER — PREDNISONE 10 MG/1
TABLET ORAL
Qty: 34 TABLET | Refills: 0 | Status: SHIPPED | OUTPATIENT
Start: 2020-01-15 | End: 2020-02-20

## 2020-01-17 ENCOUNTER — TELEPHONE (OUTPATIENT)
Dept: PRIMARY CARE CLINIC | Age: 65
End: 2020-01-17

## 2020-01-17 NOTE — TELEPHONE ENCOUNTER
I am filling out a prior authorization form for the Sharri Pont. I think it would help we had a lipid panel drawn after you have been on the Repatha, which would demonstrate how effective that drug is. Could you come to the office sometime in the fasting state and get your blood drawn and then I can include that data in the prior authorization?   I will put an order in the computer

## 2020-01-21 ENCOUNTER — OFFICE VISIT (OUTPATIENT)
Dept: PRIMARY CARE CLINIC | Age: 65
End: 2020-01-21
Payer: COMMERCIAL

## 2020-01-21 VITALS
HEIGHT: 72 IN | WEIGHT: 197.2 LBS | BODY MASS INDEX: 26.71 KG/M2 | SYSTOLIC BLOOD PRESSURE: 116 MMHG | HEART RATE: 93 BPM | OXYGEN SATURATION: 96 % | DIASTOLIC BLOOD PRESSURE: 76 MMHG

## 2020-01-21 PROBLEM — I71.20 THORACIC AORTIC ANEURYSM WITHOUT RUPTURE: Status: ACTIVE | Noted: 2020-01-21

## 2020-01-21 PROCEDURE — G8419 CALC BMI OUT NRM PARAM NOF/U: HCPCS | Performed by: FAMILY MEDICINE

## 2020-01-21 PROCEDURE — G8427 DOCREV CUR MEDS BY ELIG CLIN: HCPCS | Performed by: FAMILY MEDICINE

## 2020-01-21 PROCEDURE — 3017F COLORECTAL CA SCREEN DOC REV: CPT | Performed by: FAMILY MEDICINE

## 2020-01-21 PROCEDURE — 1036F TOBACCO NON-USER: CPT | Performed by: FAMILY MEDICINE

## 2020-01-21 PROCEDURE — G8484 FLU IMMUNIZE NO ADMIN: HCPCS | Performed by: FAMILY MEDICINE

## 2020-01-21 PROCEDURE — 99213 OFFICE O/P EST LOW 20 MIN: CPT | Performed by: FAMILY MEDICINE

## 2020-01-21 ASSESSMENT — PATIENT HEALTH QUESTIONNAIRE - PHQ9
SUM OF ALL RESPONSES TO PHQ QUESTIONS 1-9: 0
1. LITTLE INTEREST OR PLEASURE IN DOING THINGS: 0
SUM OF ALL RESPONSES TO PHQ QUESTIONS 1-9: 0
SUM OF ALL RESPONSES TO PHQ9 QUESTIONS 1 & 2: 0
2. FEELING DOWN, DEPRESSED OR HOPELESS: 0

## 2020-02-19 ENCOUNTER — OFFICE VISIT (OUTPATIENT)
Dept: VASCULAR SURGERY | Age: 65
End: 2020-02-19

## 2020-02-19 VITALS
SYSTOLIC BLOOD PRESSURE: 128 MMHG | HEIGHT: 72 IN | DIASTOLIC BLOOD PRESSURE: 82 MMHG | WEIGHT: 197 LBS | BODY MASS INDEX: 26.68 KG/M2

## 2020-02-19 PROCEDURE — 99999 PR OFFICE/OUTPT VISIT,PROCEDURE ONLY: CPT | Performed by: SURGERY

## 2020-02-20 ENCOUNTER — OFFICE VISIT (OUTPATIENT)
Dept: CARDIOTHORACIC SURGERY | Age: 65
End: 2020-02-20
Payer: COMMERCIAL

## 2020-02-20 VITALS
HEART RATE: 91 BPM | TEMPERATURE: 98.6 F | HEIGHT: 72 IN | OXYGEN SATURATION: 96 % | BODY MASS INDEX: 26.68 KG/M2 | DIASTOLIC BLOOD PRESSURE: 76 MMHG | SYSTOLIC BLOOD PRESSURE: 128 MMHG | WEIGHT: 197 LBS

## 2020-02-20 PROCEDURE — 99203 OFFICE O/P NEW LOW 30 MIN: CPT | Performed by: THORACIC SURGERY (CARDIOTHORACIC VASCULAR SURGERY)

## 2020-02-20 PROCEDURE — G8419 CALC BMI OUT NRM PARAM NOF/U: HCPCS | Performed by: THORACIC SURGERY (CARDIOTHORACIC VASCULAR SURGERY)

## 2020-02-20 PROCEDURE — G8484 FLU IMMUNIZE NO ADMIN: HCPCS | Performed by: THORACIC SURGERY (CARDIOTHORACIC VASCULAR SURGERY)

## 2020-02-20 PROCEDURE — G8427 DOCREV CUR MEDS BY ELIG CLIN: HCPCS | Performed by: THORACIC SURGERY (CARDIOTHORACIC VASCULAR SURGERY)

## 2020-02-20 PROCEDURE — 3017F COLORECTAL CA SCREEN DOC REV: CPT | Performed by: THORACIC SURGERY (CARDIOTHORACIC VASCULAR SURGERY)

## 2020-02-20 PROCEDURE — 2022F DILAT RTA XM EVC RTNOPTHY: CPT | Performed by: THORACIC SURGERY (CARDIOTHORACIC VASCULAR SURGERY)

## 2020-02-20 PROCEDURE — 1036F TOBACCO NON-USER: CPT | Performed by: THORACIC SURGERY (CARDIOTHORACIC VASCULAR SURGERY)

## 2020-02-20 PROCEDURE — 3046F HEMOGLOBIN A1C LEVEL >9.0%: CPT | Performed by: THORACIC SURGERY (CARDIOTHORACIC VASCULAR SURGERY)

## 2020-02-20 NOTE — PROGRESS NOTES
pioglitazone (ACTOS) 30 MG tablet Take 1 tablet by mouth daily 30 tablet 5    rivaroxaban (XARELTO) 20 MG TABS tablet Take 1 tablet by mouth Daily with supper 30 tablet 5     No current facility-administered medications for this visit. Allergies:  Statins and Codeine    Social History:    TOBACCO:   reports that he has never smoked. He has never used smokeless tobacco.  ETOH:   reports current alcohol use. DRUGS:   reports no history of drug use. LIFESTYLE: farmer    MARITAL STATUS:    OCCUPATION:  [employer], retired from real estate    Family History:        Problem Relation Age of Onset    Coronary Art Dis Father     COPD Father     Heart Failure Other         uncles       REVIEW OF SYSTEMS:      Personally reviewed and agree with Perfecto Seals's progress note from today.     PHYSICAL EXAM:    VITALS:  /76 (Site: Right Upper Arm)   Pulse 91   Temp 98.6 °F (37 °C) (Oral)   Ht 6' (1.829 m)   Wt 197 lb (89.4 kg)   SpO2 96%   BMI 26.72 kg/m²     Eyes:  lids and lashes normal, pupils equal and round, extra ocular muscles intact, sclera clear, conjunctiva normal    Head/ENT:  Normocephalic, atraumatic, normal gums, & palate, oropharynx without erythema or exudates    Neck:  supple, symmetrical, trachea midline, no lymphadenopathy, no jugular venous distension, no carotid bruits and MASSES:  no masses    Lungs:  no increased work of breathing, good air exchange, no retractions and clear to auscultation, no crackles or wheezing, no palpable / percussible abnormalities    Cardiovascular:  regular rate and rhythm, S1, S2 normal, no murmur, click, rub or gallop and normal apical impulse    Pulses:  Right dorsalis pedis 2, Left dorsalis pedis 2, Right posterior tibial 2, Left Posterior tibial 2, Right Femoral 2, Left Femoral 2, Right radial 2, and Left radial 2    Abdomen:  Soft, normal bowel sounds, non-tender, no hepatosplenomegaly, aorta likely normal and bruits absent    Musculoskeletal:  Back is straight and non-tender,  No CVAT, full ROM of upper and lower extremities. Extremities:   No clubbing, or cyanosis, or edema     Skin: warm and normal turgor, no ulcers, infections, or rashes, no jaundice    Neurological: awake, alert and oriented x 3, motor 5/5 bilateral upper and lower extremities, sensation grossly intact    Psychiatric: Mood and affect appear appropriate    DATA:    Other diagnostic test:  CT scan chest w/o contrast and CT neck with contrast personally reviewed and went over with patient. Ascending aorta ~4 cm with no significant change and no vascular rings causing compression of any thoracic structures. There are some coronary artery calcifications on CT chest.    ASSESSMENT AND PLAN:    Mild dilation of ascending aorta likely appropriate for his age; HTN; DM; FH of CAD and CHF    I discussed with patient and Dr. Nancy Aviles office that his ascending aorta is not an issue -- if further screening required consider repeat non-contrast CT chest in 2 years, would not consider operation until 5cm or greater. HTN is well-controlled. I think given the patient's risk factors, FH, and coronary calcifications a stress test or cardiology consult should be considered. All questions answered. Thank you for allowing me to participate in the care of this nice gentleman.     Electronically signed by Janay Ruiz MD on 2/20/2020 at 12:23 PM

## 2020-02-21 ENCOUNTER — TELEPHONE (OUTPATIENT)
Dept: PRIMARY CARE CLINIC | Age: 65
End: 2020-02-21

## 2020-02-21 ENCOUNTER — HOSPITAL ENCOUNTER (OUTPATIENT)
Dept: NON INVASIVE DIAGNOSTICS | Age: 65
Discharge: HOME | End: 2020-02-21
Payer: COMMERCIAL

## 2020-02-21 PROCEDURE — 93017 CV STRESS TEST TRACING ONLY: CPT

## 2020-02-21 NOTE — TELEPHONE ENCOUNTER
On the advice of the cardiac surgeon I am ordering a stress test for this man.   Please contact him and schedule at [facility]

## 2020-02-21 NOTE — TELEPHONE ENCOUNTER
The 10-year ASCVD risk score (Hema Morris et al., 2013) is: 21.5%    Values used to calculate the score:      Age: 62 years      Sex: Male      Is Non- : No      Diabetic: Yes      Tobacco smoker: No      Systolic Blood Pressure: 784 mmHg      Is BP treated: Yes      HDL Cholesterol: 62 mg/dL      Total Cholesterol: 215 mg/dL

## 2020-03-02 ENCOUNTER — TELEPHONE (OUTPATIENT)
Dept: CARDIOLOGY CLINIC | Age: 65
End: 2020-03-02

## 2020-03-03 ENCOUNTER — TELEPHONE (OUTPATIENT)
Dept: PRIMARY CARE CLINIC | Age: 65
End: 2020-03-03

## 2020-03-03 NOTE — TELEPHONE ENCOUNTER
Daphne from [employer] called to speak with someone about the PA for med REPATHA. Ref.  Number:  [id number]

## 2020-03-04 ENCOUNTER — TELEPHONE (OUTPATIENT)
Dept: PRIMARY CARE CLINIC | Age: 65
End: 2020-03-04

## 2020-03-05 ENCOUNTER — TELEPHONE (OUTPATIENT)
Dept: PRIMARY CARE CLINIC | Age: 65
End: 2020-03-05

## 2020-03-05 NOTE — TELEPHONE ENCOUNTER
Patient returned call from last night, he thinks that it might be regarding his stress test.   If you could call the patient back. ..  Thank you     cb- [phone]

## 2020-03-17 ENCOUNTER — OFFICE VISIT (OUTPATIENT)
Dept: CARDIOLOGY CLINIC | Age: 65
End: 2020-03-17
Payer: COMMERCIAL

## 2020-03-17 VITALS
WEIGHT: 201.2 LBS | BODY MASS INDEX: 27.29 KG/M2 | HEART RATE: 80 BPM | DIASTOLIC BLOOD PRESSURE: 70 MMHG | SYSTOLIC BLOOD PRESSURE: 110 MMHG

## 2020-03-17 PROCEDURE — 1036F TOBACCO NON-USER: CPT | Performed by: NURSE PRACTITIONER

## 2020-03-17 PROCEDURE — G8419 CALC BMI OUT NRM PARAM NOF/U: HCPCS | Performed by: NURSE PRACTITIONER

## 2020-03-17 PROCEDURE — G8427 DOCREV CUR MEDS BY ELIG CLIN: HCPCS | Performed by: NURSE PRACTITIONER

## 2020-03-17 PROCEDURE — G8484 FLU IMMUNIZE NO ADMIN: HCPCS | Performed by: NURSE PRACTITIONER

## 2020-03-17 PROCEDURE — 99204 OFFICE O/P NEW MOD 45 MIN: CPT | Performed by: NURSE PRACTITIONER

## 2020-03-17 PROCEDURE — 3017F COLORECTAL CA SCREEN DOC REV: CPT | Performed by: NURSE PRACTITIONER

## 2020-03-17 NOTE — PROGRESS NOTES
[facility] office        Marla Hollis MD,  [facility]       Cardiology                  Isabella Vaz  1955 March 17, 2020    Reason for Consult: abnormal stress test    Primary Cardiologist Heron       HPI:  The patient is 62 y.o. male with recent abnormal stress test   He c/o upper neck discomfort that makes him cough / no c/o cp / no n/v cough fever   / denies PND / MIKI   went to ENT and test wnl   This has been happening since Dec states since he had the flu     GXT without nuc 2/21/20   Using the Loyd protocol, the patient was stressed for 07:00 minutes 00:29   seconds. The target heart rate was 141 and the patient achieved a heart rate   of 222 (Stage 2 Manual). Exercise was terminated because of achievement of   target heart rate and irregular heart rate. Impression:   Negative for ischemia. Run of SVT noted, self terminated. 12/2019 CT chest  Mild aneurysmal dilatation of ascending thoracic aorta minimal reduction from prior study  Coronary calcification    1/2020 soft tissue CT   1. No mass lesion or evidence of radiopaque foreign body. No acute abnormality in the neck. No lymphadenopathy identified. 2. Bilateral carotid artery calcification without definite flow-limiting stenosis       PMH DVTs / VT2DM / HLD /  HTN  / nonsmoker    Review of Systems:  Constitutional: No fatigue, weakness, night sweats or fever. HEENT: No new vision difficulties or ringing in the ears. Respiratory: No new SOB, PND, orthopnea or cough. Cardiovascular: See HPI   GI: No n/v, diarrhea, constipation, abdominal pain or changes in bowel habits. No melena, no hematochezia  : No urinary frequency, urgency, incontinence, hematuria or dysuria. Skin: No cyanosis or skin lesions. Musculoskeletal: No new muscle or joint pain. Neurological: No syncope or TIA-like symptoms.   Psychiatric: No anxiety, insomnia or depression Past Medical History:   Diagnosis Date    [facility]     DVT (deep venous thrombosis) ([facility] 2012    L leg    DVT (deep venous thrombosis) ([facility] 2011    R leg    Hypertension     Statin intolerance 2/27/2017    Thoracic aortic aneurysm without rupture ([facility] 1/21/2020    Type 2 diabetes mellitus without complication, without long-term current use of insulin ([facility] 10/23/2017     Past Surgical History:   Procedure Laterality Date    CARDIOVASCULAR STRESS TEST  may 2011    normal    CARPAL TUNNEL RELEASE      FOOT SURGERY      club foot, right foot, 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  2/10    implant R calf,      Family History   Problem Relation Age of Onset    Coronary Art Dis Father     COPD Father     Heart Failure Other         uncles     Social History     Tobacco Use    Smoking status: Never Smoker    Smokeless tobacco: Never Used   Substance Use Topics    Alcohol use: Yes     Comment: rare    Drug use: No       Allergies   Allergen Reactions    Statins Other (See Comments)     Myalgias and cramping    Codeine Nausea And Vomiting     Current Outpatient Medications   Medication Sig Dispense Refill    sildenafil (VIAGRA) 100 MG tablet Take 1 tablet by mouth daily as needed for Erectile Dysfunction 30 tablet 5    amLODIPine (NORVASC) 5 MG tablet Take 1 tablet by mouth daily 30 tablet 5    valsartan-hydrochlorothiazide (DIOVAN-HCT) 320-25 MG per tablet TAKE ONE TABLET BY MOUTH DAILY 30 tablet 5    metFORMIN (GLUCOPHAGE) 500 MG tablet TAKE TWO TABLETS BY MOUTH EVERY MORNING AND TAKE ONE TABLET BY MOUTH EVERY EVENING 90 tablet 5    pioglitazone (ACTOS) 30 MG tablet Take 1 tablet by mouth daily 30 tablet 5    rivaroxaban (XARELTO) 20 MG TABS tablet Take 1 tablet by mouth Daily with supper 30 tablet 5     No current facility-administered medications for this visit.         Physical Exam:   /70   Pulse 80   Wt 201 lb 3.2 oz (91.3 kg)   BMI 27.29 kg/m²     BP Readings from Last 3 Encounters:   03/17/20 110/70   02/20/20 128/76   02/19/20 128/82     Pulse Readings from Last 3 Encounters:   03/17/20 80   02/20/20 91   01/21/20 93     Wt Readings from Last 3 Encounters:   03/17/20 201 lb 3.2 oz (91.3 kg)   02/20/20 197 lb (89.4 kg)   02/19/20 197 lb (89.4 kg)     No intake or output data in the 24 hours ending 03/17/20 1416    Constitutional: He is oriented to person, place, and time. He appears well-developed and well-nourished. In no acute distress. Head: Normocephalic and atraumatic. Eyes: FARIDEH   Neck: Neck supple. No JVD present. Carotid bruit is not present. No mass and no thyromegaly present. No lymphadenopathy present. Cardiovascular: Normal rate, regular rhythm, normal heart sounds and intact distal pulses. Exam reveals no gallop and no friction rub. No murmur heard. Pulmonary/Chest: Effort normal and breath sounds normal. No respiratory distress. He has no wheezes, rhonchi or rales. Abdominal: Soft, non-tender. Bowel sounds and aorta are normal. He exhibits no organomegaly, mass or bruit. Extremities: No edema, cyanosis, or clubbing. Pulses are 2+ radial/carotid/dorsalis pedis and posterior tibial bilaterally. Neurological: He is alert and oriented to person, place, and time. He has normal reflexes. No cranial nerve deficit. Coordination normal.   Skin: Skin is warm and dry. There is no rash or diaphoresis. Psychiatric: He has a normal mood and affect.  His speech is normal and behavior is normal.     Lab Results   Component Value Date    TRIG 143 10/04/2019    HDL 62 10/04/2019    LDLCALC 124 10/04/2019    LABVLDL 29 10/04/2019     Lab Results   Component Value Date     02/20/2019    K 4.5 02/20/2019    BUN 23 02/20/2019    CREATININE 1.2 01/10/2020    CREATININE 1.0 02/20/2019       Assessment:     recent abnormal stress test   He c/o upper neck discomfort that makes him cough / no c/o cp / no n/v cough fever   / denies PND / MIKI   This has been happening since Dec states since he had the flu     GXT without nuc 2/21/20   Using the Loyd protocol, the patient was stressed for 07:00 minutes 00:29   seconds. The target heart rate was 141 and the patient achieved a heart rate   of 222 (Stage 2 Manual). Exercise was terminated because of achievement of   target heart rate and irregular heart rate. Impression:   Negative for ischemia. Run of SVT noted, self terminated.     DVTs  On xarelto   Discussed NSAIDS   / tylenol prn     AA  12/2019 CT chest  Mild aneurysmal dilatation of ascending thoracic aorta minimal reduction from prior study  Coronary calcification      T2DM  glucose 131   Glucophage and Actos      HLD   HDL 62   allergic to  statins   His Vit D // deficiency 29.8      HTN    Controlled     EKG 3/17/20    Plan:  us carotid   CTA cardiac   Fu in 2-3 weeks      Krysta CORONA, CVNP

## 2020-03-23 ENCOUNTER — HOSPITAL ENCOUNTER (OUTPATIENT)
Dept: CT IMAGING | Age: 65
Discharge: HOME | End: 2020-03-23
Payer: COMMERCIAL

## 2020-03-23 VITALS
RESPIRATION RATE: 18 BRPM | SYSTOLIC BLOOD PRESSURE: 117 MMHG | DIASTOLIC BLOOD PRESSURE: 68 MMHG | OXYGEN SATURATION: 98 % | HEART RATE: 71 BPM

## 2020-03-23 LAB
GFR AFRICAN AMERICAN: >60
GFR NON-AFRICAN AMERICAN: >60
PERFORMED ON: NORMAL
POC CREATININE: 1.1 MG/DL (ref 0.8–1.3)
POC SAMPLE TYPE: NORMAL

## 2020-03-23 PROCEDURE — 82565 ASSAY OF CREATININE: CPT

## 2020-03-23 PROCEDURE — 6360000004 HC RX CONTRAST MEDICATION: Performed by: NURSE PRACTITIONER

## 2020-03-23 PROCEDURE — 75574 CT ANGIO HRT W/3D IMAGE: CPT

## 2020-03-23 RX ADMIN — IOPAMIDOL 80 ML: 755 INJECTION, SOLUTION INTRAVENOUS at 13:49

## 2020-03-24 ENCOUNTER — OFFICE VISIT (OUTPATIENT)
Dept: CARDIOLOGY CLINIC | Age: 65
End: 2020-03-24
Payer: COMMERCIAL

## 2020-03-24 VITALS
SYSTOLIC BLOOD PRESSURE: 125 MMHG | BODY MASS INDEX: 27.07 KG/M2 | HEART RATE: 80 BPM | DIASTOLIC BLOOD PRESSURE: 80 MMHG | WEIGHT: 199.6 LBS

## 2020-03-24 PROCEDURE — 99214 OFFICE O/P EST MOD 30 MIN: CPT | Performed by: NURSE PRACTITIONER

## 2020-03-24 PROCEDURE — G8484 FLU IMMUNIZE NO ADMIN: HCPCS | Performed by: NURSE PRACTITIONER

## 2020-03-24 PROCEDURE — 1036F TOBACCO NON-USER: CPT | Performed by: NURSE PRACTITIONER

## 2020-03-24 PROCEDURE — G8427 DOCREV CUR MEDS BY ELIG CLIN: HCPCS | Performed by: NURSE PRACTITIONER

## 2020-03-24 PROCEDURE — 3017F COLORECTAL CA SCREEN DOC REV: CPT | Performed by: NURSE PRACTITIONER

## 2020-03-24 PROCEDURE — 93000 ELECTROCARDIOGRAM COMPLETE: CPT | Performed by: NURSE PRACTITIONER

## 2020-03-24 PROCEDURE — G8419 CALC BMI OUT NRM PARAM NOF/U: HCPCS | Performed by: NURSE PRACTITIONER

## 2020-03-24 NOTE — PROGRESS NOTES
Readings from Last 3 Encounters:   03/24/20 125/80   03/23/20 117/68   03/17/20 110/70     Pulse Readings from Last 3 Encounters:   03/24/20 80   03/23/20 71   03/17/20 80     Wt Readings from Last 3 Encounters:   03/24/20 199 lb 9.6 oz (90.5 kg)   03/17/20 201 lb 3.2 oz (91.3 kg)   02/20/20 197 lb (89.4 kg)     Constitutional: He is oriented to person, place, and time. He appears well-developed and well-nourished. In no acute distress. HEENT: Normocephalic and atraumatic. Sclerae anicteric. No xanthelasmas. Conjunctiva white, no subconjunctival hemorrhage   External inspection of ears nose teeth & gums   Eyes:PERRLA EOM's intact. Neck: Neck supple. No JVD present. Carotids without bruits. No mass and no thyromegaly present. No lymphadenopathy present. Cardiovascular: RRR, normal S1 and S2; no murmur/gallop or rub, PMI nondisplaced  Pulmonary/Chest: Effort normal.  Lungs clear to auscultation. Chest wall nontender  Abdominal: soft, nontender, nondistended. + bowel sounds; no organomegaly or bruits. Aorta normal  Extremities: No edema, cyanosis, or clubbing. Pulses are 2+ radial/carotid/dorsalis pedis bilaterally. Cap refill brisk. Neurological: No cranial nerve deficit. Psychiatric: He has a normal mood and affect.  His speech is normal and behavior is normal.     Lab Review:   Lab Results   Component Value Date    TRIG 143 10/04/2019    HDL 62 10/04/2019    LDLCALC 124 10/04/2019    LABVLDL 29 10/04/2019     Lab Results   Component Value Date     02/20/2019    K 4.5 02/20/2019     02/20/2019    CO2 26 02/20/2019    BUN 23 02/20/2019    CREATININE 1.1 03/23/2020    CREATININE 1.0 02/20/2019    GLUCOSE 131 02/20/2019    GLUCOSE 126 03/07/2012    CALCIUM 9.7 02/20/2019      Lab Results   Component Value Date    WBC 5.2 12/16/2012    HGB 13.7 12/16/2012    HCT 40.0 12/16/2012    MCV 91.3 12/16/2012     12/16/2012     I have reviewed any available labs, images, any stress test, LHC on this pt Assessment:    abnormal CTA coronary and discussed [facility] indicated, risks benefits & alternatives has been discussed   He had abnormal stress test but did not want [facility] until he had CTA cardiac which has pete completed and abnormal.   CTA cardiac 3/23/2020   Hemodynamically significant stenosis of the proximal-mid LAD requires further evaluation with conventional angiography. GXT without nuc 2/21/20   Using the Loyd protocol, the patient was stressed for 07:00 minutes 00:29   seconds. The target heart rate was 141 and the patient achieved a heart rate   of 222 (Stage 2 Manual). Exercise was terminated because of achievement of   target heart rate and irregular heart rate.   Impression:   Negative for ischemia. Run of SVT noted, self terminated.      DVTs  On xarelto   Discussed NSAIDS   / tylenol prn      AA  12/2019 CT chest  Mild aneurysmal dilatation of ascending thoracic aorta minimal reduction from prior study  Coronary calcification       T2DM  glucose 131   Glucophage and Actos       HLD   HDL 62   allergic to  statins   His Vit D // deficiency 29.8       HTN    Controlled      Plan:  [facility] planned on Friday at approx 1pm   Hold xarelto and metformin   Fu with me in approx week    Mark CORONA,CVNP

## 2020-03-27 ENCOUNTER — HOSPITAL ENCOUNTER (OUTPATIENT)
Dept: CARDIAC CATH/INVASIVE PROCEDURES | Age: 65
Discharge: HOME | End: 2020-03-27
Attending: INTERNAL MEDICINE | Admitting: INTERNAL MEDICINE
Payer: COMMERCIAL

## 2020-03-27 VITALS — BODY MASS INDEX: 26.95 KG/M2 | WEIGHT: 199 LBS | HEIGHT: 72 IN

## 2020-03-27 PROBLEM — I47.10 SVT (SUPRAVENTRICULAR TACHYCARDIA): Status: ACTIVE | Noted: 2020-03-27

## 2020-03-27 PROBLEM — I47.1 SVT (SUPRAVENTRICULAR TACHYCARDIA) (HCC): Status: ACTIVE | Noted: 2020-03-27

## 2020-03-27 PROBLEM — I25.10 CAD IN NATIVE ARTERY: Status: ACTIVE | Noted: 2020-03-27

## 2020-03-27 PROBLEM — I77.810 DILATED AORTIC ROOT (HCC): Status: ACTIVE | Noted: 2020-03-27

## 2020-03-27 LAB
ANION GAP SERPL CALCULATED.3IONS-SCNC: 14 MMOL/L (ref 3–16)
BUN BLDV-MCNC: 19 MG/DL (ref 7–20)
CALCIUM SERPL-MCNC: 9.6 MG/DL (ref 8.3–10.6)
CHLORIDE BLD-SCNC: 97 MMOL/L (ref 99–110)
CO2: 26 MMOL/L (ref 21–32)
CREAT SERPL-MCNC: 1 MG/DL (ref 0.8–1.3)
EKG ATRIAL RATE: 84 BPM
EKG DIAGNOSIS: NORMAL
EKG P AXIS: 48 DEGREES
EKG P-R INTERVAL: 200 MS
EKG Q-T INTERVAL: 388 MS
EKG QRS DURATION: 82 MS
EKG QTC CALCULATION (BAZETT): 458 MS
EKG R AXIS: 58 DEGREES
EKG T AXIS: 62 DEGREES
EKG VENTRICULAR RATE: 84 BPM
GFR AFRICAN AMERICAN: >60
GFR NON-AFRICAN AMERICAN: >60
GLUCOSE BLD-MCNC: 173 MG/DL (ref 70–99)
HCT VFR BLD CALC: 40.6 % (ref 40.5–52.5)
HEMOGLOBIN: 14 G/DL (ref 13.5–17.5)
INR BLD: 0.97 (ref 0.86–1.14)
MCH RBC QN AUTO: 31.2 PG (ref 26–34)
MCHC RBC AUTO-ENTMCNC: 34.5 G/DL (ref 31–36)
MCV RBC AUTO: 90.2 FL (ref 80–100)
PDW BLD-RTO: 13.2 % (ref 12.4–15.4)
PLATELET # BLD: 277 K/UL (ref 135–450)
PMV BLD AUTO: 7.4 FL (ref 5–10.5)
POTASSIUM SERPL-SCNC: 3.8 MMOL/L (ref 3.5–5.1)
PROTHROMBIN TIME: 11.3 SEC (ref 10–13.2)
RBC # BLD: 4.49 M/UL (ref 4.2–5.9)
SODIUM BLD-SCNC: 137 MMOL/L (ref 136–145)
WBC # BLD: 4.5 K/UL (ref 4–11)

## 2020-03-27 PROCEDURE — 93567 NJX CAR CTH SPRVLV AORTGRPHY: CPT | Performed by: INTERNAL MEDICINE

## 2020-03-27 PROCEDURE — C1760 CLOSURE DEV, VASC: HCPCS

## 2020-03-27 PROCEDURE — 93567 NJX CAR CTH SPRVLV AORTGRPHY: CPT

## 2020-03-27 PROCEDURE — 93458 L HRT ARTERY/VENTRICLE ANGIO: CPT | Performed by: INTERNAL MEDICINE

## 2020-03-27 PROCEDURE — 93005 ELECTROCARDIOGRAM TRACING: CPT | Performed by: INTERNAL MEDICINE

## 2020-03-27 PROCEDURE — C1769 GUIDE WIRE: HCPCS

## 2020-03-27 PROCEDURE — 85027 COMPLETE CBC AUTOMATED: CPT

## 2020-03-27 PROCEDURE — 93010 ELECTROCARDIOGRAM REPORT: CPT | Performed by: INTERNAL MEDICINE

## 2020-03-27 PROCEDURE — 80048 BASIC METABOLIC PNL TOTAL CA: CPT

## 2020-03-27 PROCEDURE — 6360000002 HC RX W HCPCS

## 2020-03-27 PROCEDURE — 6360000004 HC RX CONTRAST MEDICATION: Performed by: INTERNAL MEDICINE

## 2020-03-27 PROCEDURE — 2709999900 HC NON-CHARGEABLE SUPPLY

## 2020-03-27 PROCEDURE — 93458 L HRT ARTERY/VENTRICLE ANGIO: CPT

## 2020-03-27 PROCEDURE — 2500000003 HC RX 250 WO HCPCS

## 2020-03-27 PROCEDURE — C1894 INTRO/SHEATH, NON-LASER: HCPCS

## 2020-03-27 PROCEDURE — 99152 MOD SED SAME PHYS/QHP 5/>YRS: CPT

## 2020-03-27 PROCEDURE — 85610 PROTHROMBIN TIME: CPT

## 2020-03-27 PROCEDURE — 99153 MOD SED SAME PHYS/QHP EA: CPT

## 2020-03-27 RX ORDER — SODIUM CHLORIDE 9 MG/ML
INJECTION, SOLUTION INTRAVENOUS CONTINUOUS
Status: DISCONTINUED | OUTPATIENT
Start: 2020-03-27 | End: 2020-03-27 | Stop reason: HOSPADM

## 2020-03-27 RX ORDER — ACETAMINOPHEN 325 MG/1
650 TABLET ORAL EVERY 4 HOURS PRN
Status: DISCONTINUED | OUTPATIENT
Start: 2020-03-27 | End: 2020-03-27 | Stop reason: HOSPADM

## 2020-03-27 RX ADMIN — IOHEXOL 200 ML: 350 INJECTION, SOLUTION INTRAVENOUS at 14:06

## 2020-03-27 NOTE — CONSULTS
CJ Love CNP   Nurse Practitioner   Cardiology   Progress Notes   Signed   Encounter Date:  3/24/2020               Signed        Expand All Collapse All      Show:Clear all  [x]Manual[x]Template[x]Copied    Added by:  CJ De Souza CNP    []Rickie for details                                                       [address]  Office Visit                       [place] Patient  1955 March 24, 2020     Primary Cardiologist: Jatinder Quiroz       Today discussed his abnormal CTA coronary and discussed LHC   LHC indicated, risks benefits & alternatives has been discussed   He had abnormal stress test but did not want LHC until he had CTA cardiac which has pete completed and abnormal.      CTA cardiac  3/23/2020   Hemodynamically significant stenosis of the proximal-mid LAD requires further evaluation with conventional angiography.      CC: Subjective:HPI:  The patient is 62 y.o. male with recent abnormal stress test   He c/o upper neck discomfort that makes him cough / no c/o cp / no n/v cough fever   / denies PND / MIKI   went to ENT and test wnl   This has been happening since Dec states since he had the flu      GXT without nuc 2/21/20   Using the Loyd protocol, the patient was stressed for 07:00 minutes 00:29   seconds. The target heart rate was 141 and the patient achieved a heart rate   of 222 (Stage 2 Manual). Exercise was terminated because of achievement of   target heart rate and irregular heart rate.   Impression:   Negative for ischemia. Run of SVT noted, self terminated.     Reviewed most recent test with pt.      Review of Systems:  Constitutional: Denies  fatigue, weakness, night sweats or fever. HEENT: Denies new visual changes, ringing in ears, nosebleeds,nasal congestion  Respiratory: Denies new or change in SOB, PND, orthopnea or cough.    Cardiovascular: see HPI  GI: Denies N/V, diarrhea, constipation, abdominal pain, change in bowel habits, melena or Value Date      02/20/2019     K 4.5 02/20/2019      02/20/2019     CO2 26 02/20/2019     BUN 23 02/20/2019     CREATININE 1.1 03/23/2020     CREATININE 1.0 02/20/2019     GLUCOSE 131 02/20/2019     GLUCOSE 126 03/07/2012     CALCIUM 9.7 02/20/2019            Lab Results   Component Value Date     WBC 5.2 12/16/2012     HGB 13.7 12/16/2012     HCT 40.0 12/16/2012     MCV 91.3 12/16/2012      12/16/2012      I have reviewed any available labs, images, any stress test, LHC on this pt             abnormal CTA coronary and discussed LHC   LHC indicated, risks benefits & alternatives has been discussed   He had abnormal stress test but did not want LHC until he had CTA cardiac which has pete completed and abnormal.   CTA cardiac 3/23/2020   Hemodynamically significant stenosis of the proximal-mid LAD requires further evaluation with conventional angiography.      GXT without nuc 2/21/20   Using the Loyd protocol, the patient was stressed for 07:00 minutes 00:29   seconds. The target heart rate was 141 and the patient achieved a heart rate   of 222 (Stage 2 Manual). Exercise was terminated because of achievement of   target heart rate and irregular heart rate.   Impression:   Negative for ischemia. Run of SVT noted, self terminated.      DVTs  On xarelto   Discussed NSAIDS   / tylenol prn      AA  12/2019 CT chest  Mild aneurysmal dilatation of ascending thoracic aorta minimal reduction from prior study  Coronary calcification       T2DM  glucose 131   Glucophage and Actos       HLD   HDL 62   allergic to  statins   His Vit D // deficiency 29.8       HTN    Controlled      Plan:  LHC planned on Friday at approx 1pm   Hold xarelto and metformin                         No routing history on file.

## 2020-03-27 NOTE — PROCEDURES
[facility] [address], [address]                            CARDIAC CATHETERIZATION    PATIENT NAME: Octaviano Wan                    :        1955  MED REC NO:   [id number]                          ROOM:  ACCOUNT NO:   [de-identified]                           ADMIT DATE: 2020  PROVIDER:     Sharon Abreu MD    DATE OF PROCEDURE:  2020    PROCEDURES:  Left heart catheterization, coronary cineangiography,  aortic root injection, Angio-Seal of the right femoral arteriotomy site. HISTORY:  The patient is a 79-year-old male with history of dilated  aorta, diabetes, hypertension, and DVT. He has known dilated aorta. He  presented with some chest tightness, for which he underwent evaluation. He was seen by Vascular Surgery as well as CVTS. He did have coronary  artery calcification noted on CT. Because of this, he underwent stress  testing which was negative; however, he had a run of SVT during  exercise. He had no chest discomfort and normal ST response. He  underwent CT angio which demonstrated LAD disease, felt to be  significant. Because of this, it was felt he should undergo  catheterization. TECHNICAL PROCEDURE:  The patient was brought to the cardiac  catheterization lab on 2020 where the right femoral artery was  prepped and draped in the usual sterile fashion. After anesthetizing  the area with 2% lidocaine, a 5-Turkmen sheath was placed in the right  femoral artery using Seldinger technique. Subsequently, left heart  catheterization, left ventriculography, aortic root injection as well as  selective coronary cineangiography of both left and right coronaries  were performed in multiple projections. This was performed using  5-Turkmen pigtail JL4 and JR4 diagnostic catheters. The patient  tolerated the procedure well. No complications were encountered.   A  brief right femoral arteriogram was Psychiatry/Suboxone Maintenance Progress Note      History:     Patient report: Still doing well, no new issues, ready to begin suboxone taper.       Review of Systems: negative  Bowels: regular bowel movements without need for bowel regimen or only PRN use.  Patient's goals for Suboxone therapy and today's visit:Wishes to remain on current dose at this time    Controlled substance prescriptions dispensed were checked and verified using the [facility] PDMP website and: no irregularities noted.    Laboratory Tests or other studies:    UDS: positive for buprenorphine and otherwise negative       Current Medications:  Current Outpatient Medications   Medication Sig   • lisdexamfetamine (VYVANSE) 60 MG capsule Take 1 capsule by mouth every morning.   • buprenorphine-naLOXone (SUBOXONE FILM) 8-2 MG sublingual film Place 1.75 strips under the tongue daily.   • [START ON 4/7/2020] lisdexamfetamine (VYVANSE) 60 MG capsule Take 1 capsule by mouth every morning. Do not start before April 7, 2020.   • zolpidem (AMBIEN) 5 MG tablet Take 0.5 tablets by mouth nightly as needed for Sleep.   • doxepin (SINEQUAN) 25 MG capsule Take 1 capsule by mouth nightly.     No current facility-administered medications for this visit.            Mental Status Examination:    Appearance  [x] Unremarkable  []  Thin  [] Uncomfortable  [] Intoxicated  [] Other:    Hygiene  [x] Unremarkable  []  Marginal  []  Disheveled  []  Malodorous  []  Unkempt    []  Other:    Interpersonal behavior  [x]  Appropriate  []  Pleasant  []  Engaged  []  Guarded  []  Hostile  []  Withdrawn  []  Good eye contact  []  Avoidant eye contact  []  Other:    Speech Rate  [x] Normal  []   Fast  []   Slow  []   Pressured    Speech clarity  [x]   Clear  []   Dysarthric  []   Other:    Speech Volume  []   Soft  []   Loud  [x]   Normal    Speech Latency  [x]   Normal  []   Reduced  []   Prolonged    Mood  [x]   \"good\"  []  \"angry”  []   “sad”  [] \"depressed\"  []    \"anxious\"  []   \"worried\"  []   Not stated  []   Other:    Affect  [x]   Euthymic  []   Dysthymic  []   Anxious  []   Tense  []   Irritable  []   Sad    []   Tearful  []   Bright  []   Constricted  []   Blunted  []   Flat  []   Expansive  []   Euphoric  []   Congruent with stated mood  [] Not congruent with stated mood  []   Appropriate to situation and conversation  []   Inappropriate to situation or conversation  []   Stable  [] Labile  []   Fluid  []   Other:    Thought process  [x]   Linear  []   Logical  []   Well organized  []   Circumstantial  [] Tangential   []   Flight of ideas  []   [place]  []   Rigid  []   Difficult to follow   []   Disorganized  [] Other:    Thought Content  [x]  Unremarkable  []   Suspiciousness  []   Paranoia  []   Rumination   []  Self-critical  []   Catastrophizing  []   Grandiose  []   Delusional  []  Ideas of reference  []   Thought broadcasting or insertion  []  Obsessions or intrusive thoughts  []  Hopelessness  []  Somatic preoccupation  []  Other:    Perception/hallucinations  [x]  None reported or observed  []  Auditory hallucinations  [] Visual hallucinations  []  Second person  []  Command  []  Derogatory  []  Third person  []   Vague  []  Faint  []  Overpowering []  Grossly impaired or clouded sensorium    []  Other impairment:    Orientation, oriented to  [x]  Self  [x]  Place  [x]  Time  [x]  Situation  []  Other:  []  Not assessed    Attention and concentration  [x] No impairment noted in course of interview  []  Distractible  []  Difficulty sustaining or shifting attention  [] Assessed/screened as follows:    Memory  [x]  No impairment noted in course of interview as evidenced by recall of recent and distant events  []  Some impairment suspected from gaps in interview as follows:  []  Assessed/screened as follows:    Insight  []  Good as evidenced by awareness of illness  [x]  Fair as evidenced by awareness of illness, with some limitations  []  Poor, with substantial  limitations to awareness of or nature of illness  []  Other:    Judgment  []  Good as evidenced by reasonable decision making and interpersonal appropriateness  [x]  Fair, some limitations noted such as impulsivity or marked ambivalence  []  Poor, as follows:  []  Other:    Suicidal thoughts  [x]  Denies  []  Present, denies intent or plan  []  At baseline  []  Present, with some intent or plan  []  Other:    Homicidal/Assaultive Ideation   [x]  Denies  []  Other, specify:    Gait   [x]  Steady  [x]  Well-paced  []  Wide-space  []  Slow  []  Unsteady  []  Requires assistive device  []  Not assessed  []  Other:            Medical Decision Making    Diagnoses:    Opioid use disorder, severe, dependence (CMS/[facility])  (primary encounter diagnosis)  Attention deficit hyperactivity disorder (ADHD), predominantly inattentive type  MDD (major depressive disorder), recurrent, in full remission (CMS/[facility])  Cocaine use disorder, severe, dependence (CMS/[facility])  Attention deficit hyperactivity disorder, inattentive type  Opioid type dependence, continuous (CMS/[facility])      Narrative assessment and plan:  Stable in long-term sobriety, ready to begin Suboxone taper as written. No other changes at this time.      Risk Assessment: no indication of acutely increased risk of harm to self or others above personal baseline, patient's baseline risk of harm to self is low, patient's baseline risk of harm to others is low and low risk of relapse    Follow up: 3 months

## 2020-03-27 NOTE — CONSULTS
Procedure: LHC, Ao Root , Melanie cor, LV gram, angioseal RFA  Complication: none  EBL <5 cc  Specimen: none  Preliminary: LV uniform. EF 60%. Ao root injection: Triv AI, mild dilatation of ascending Ao.   No significant obstructive CAD    Successful angioseal RFA

## 2020-03-27 NOTE — CONSULTS
Brief Pre-Op Note/Sedation Assessment      Amalia Hartman  1955  Cath Pool/NONE      [id number]  1:32 PM    Planned Procedure: Cardiac Catheterization Procedure    Post Procedure Plan: Return to same level of care    Consent: I have discussed with the patient and/or the patient representative the indication, alternatives, and the possible risks and/or complications of the planned procedure and the anesthesia methods. The patient and/or patient representative appear to understand and agree to proceed. Chief Complaint: Chest Pain/Pressure      Indications for Cath Procedure:  Suspected CAD  Anginal Classification within 2 weeks:  CCS III - Symptoms with everyday living activities, i.e., moderate limitation  NYHA Heart Failure Class within 2 weeks: No symptoms  Is Cath Lab Visit Valve-related?: No  Surgical Risk: N/A  Functional Type: N/A    Anti- Anginal Meds within 2 weeks:   Yes: Aspirin    Stress or Imaging Studies Performed:  Standard Exercise Stress Result:  Negative but with run SVT with exercise. Risk/Extent of Ischemia:  Unavailable     Vital Signs:  Ht 6' (1.829 m)   Wt 199 lb (90.3 kg)   BMI 26.99 kg/m²     Allergies:   Allergies   Allergen Reactions    Statins Other (See Comments)     Myalgias and cramping    Codeine Nausea And Vomiting       Past Medical History:  Past Medical History:   Diagnosis Date    Cerebral palsy ([address]     DVT (deep venous thrombosis) ([address] 2012    L leg    DVT (deep venous thrombosis) ([address] 2011    R leg    Hypertension     Statin intolerance 2/27/2017    Thoracic aortic aneurysm without rupture ([address] 1/21/2020    Type 2 diabetes mellitus without complication, without long-term current use of insulin ([address] 10/23/2017         Surgical History:  Past Surgical History:   Procedure Laterality Date    CARDIOVASCULAR STRESS TEST  may 2011    normal    CARPAL TUNNEL RELEASE      FOOT SURGERY      club foot, right foot, 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

## 2020-04-06 PROBLEM — R93.89 ABNORMAL ANGIOGRAM: Status: ACTIVE | Noted: 2020-04-06

## 2020-04-07 RX ORDER — PIOGLITAZONEHYDROCHLORIDE 30 MG/1
TABLET ORAL
Qty: 30 TABLET | Refills: 4 | Status: SHIPPED | OUTPATIENT
Start: 2020-04-07 | End: 2020-09-09

## 2020-04-07 RX ORDER — VALSARTAN AND HYDROCHLOROTHIAZIDE 320; 25 MG/1; MG/1
TABLET, FILM COATED ORAL
Qty: 30 TABLET | Refills: 4 | Status: SHIPPED | OUTPATIENT
Start: 2020-04-07 | End: 2020-09-09

## 2020-04-07 NOTE — TELEPHONE ENCOUNTER
Medication:   Requested Prescriptions     Pending Prescriptions Disp Refills    metFORMIN (GLUCOPHAGE) 500 MG tablet [Pharmacy Med Name: metFORMIN  MG TABLET] 90 tablet 4     Sig: TAKE TWO TABLETS BY MOUTH EVERY MORNING AND TAKE ONE TABLET BY MOUTH EVERY EVENING    valsartan-hydrochlorothiazide (DIOVAN-HCT) 320-25 MG per tablet [Pharmacy Med Name: VALSARTAN-HCTZ 320-25 MG TAB] 30 tablet 4     Sig: TAKE ONE TABLET BY MOUTH DAILY    pioglitazone (ACTOS) 30 MG tablet [Pharmacy Med Name: PIOGLITAZONE HCL 30 MG TABLET] 30 tablet 4     Sig: TAKE ONE TABLET BY MOUTH DAILY         Last appt: 1/21/2020   Next appt: Visit date not found    Last Labs DM:   Lab Results   Component Value Date    LABA1C 7.2 09/23/2019

## 2020-04-08 ENCOUNTER — OFFICE VISIT (OUTPATIENT)
Dept: CARDIOLOGY CLINIC | Age: 65
End: 2020-04-08
Payer: COMMERCIAL

## 2020-04-08 VITALS
DIASTOLIC BLOOD PRESSURE: 80 MMHG | SYSTOLIC BLOOD PRESSURE: 120 MMHG | BODY MASS INDEX: 27.02 KG/M2 | HEART RATE: 88 BPM | WEIGHT: 199.2 LBS

## 2020-04-08 PROCEDURE — G8419 CALC BMI OUT NRM PARAM NOF/U: HCPCS | Performed by: NURSE PRACTITIONER

## 2020-04-08 PROCEDURE — 3017F COLORECTAL CA SCREEN DOC REV: CPT | Performed by: NURSE PRACTITIONER

## 2020-04-08 PROCEDURE — 1036F TOBACCO NON-USER: CPT | Performed by: NURSE PRACTITIONER

## 2020-04-08 PROCEDURE — G8427 DOCREV CUR MEDS BY ELIG CLIN: HCPCS | Performed by: NURSE PRACTITIONER

## 2020-04-08 PROCEDURE — 99214 OFFICE O/P EST MOD 30 MIN: CPT | Performed by: NURSE PRACTITIONER

## 2020-04-08 RX ORDER — ICOSAPENT ETHYL 1000 MG/1
2 CAPSULE ORAL 2 TIMES DAILY
Qty: 120 CAPSULE | Refills: 3 | Status: SHIPPED | OUTPATIENT
Start: 2020-04-08 | End: 2021-10-27

## 2020-04-08 NOTE — LETTER
4/8/20     Mr. Jacob Dixon has been under my care. Mr. Berenice Frankel may return to work on May 4th, ful duty.     Thank you,   Pat WORKMAN

## 2020-04-08 NOTE — PROGRESS NOTES
[address]  Office Visit           Ronn Ty MD,  [address] APRN [address] [facility]       Cardiology             Dread Miles  1955 April 8, 2020    Primary Cardiologist: Melania Serrano        CC: Subjective:HPI:  The patient is 62 y.o. male with cp with abnormal stress test & abnormal CTA coronary and then [facility]  3/27/20    1. Normal left ventricular systolic function. 2.  Mild aortic root dilatation. 3.  No significant obstructive coronary artery disease. 4. Right coronary artery is a nondominant vessel. 5. There is high rising branch vessel that has a 70% stenosis, but the  right coronary artery  which is nondominant and is normal/nonobstructive     HTN optimal  DMT2 controlled   SVT no c/o palpitations    HLD /  10/2019   states can't take any statins / discussed PCSK9 / does not want it now   Wants to try Hayder Esteban / will prescribe  & starting on Vit D with Co Q10   Today no c/o cp/SOB/edema/PND/MIKI   complaint with meds   Discussed nutrition / sodium intake / fluid intake   Recommend activity as tolerated     Reviewed most recent test with pt. Review of Systems:  Constitutional: Denies  fatigue, weakness, night sweats or fever. HEENT: Denies new visual changes, ringing in ears, nosebleeds,nasal congestion  Respiratory: Denies new or change in SOB, PND, orthopnea or cough. Cardiovascular: see HPI  GI: Denies N/V, diarrhea, constipation, abdominal pain, change in bowel habits, melena or hematochezia  : Denies urinary frequency, urgency, incontinence, hematuria or dysuria. Skin: Denies rash, hives, or cyanosis  Musculoskeletal: Denies joint or muscle aches/pain  Neurological: Denies syncope or TIA-like symptoms.   Psychiatric: Denies anxiety, insomnia or depression     Past Medical History:   Diagnosis Date    Cerebral palsy ([address]     DVT (deep venous thrombosis) ([address] 2012    L leg    DVT (deep venous thrombosis) ([address] 2011    R leg    branch vessel that has a 70% stenosis, but the  right coronary artery  which is nondominant and is normal/nonobstructive     HTN optimal    DMT2 controlled     SVT no c/o palpitations      HLD /  10/2019   states can't take any statins / discussed PCSK9 / does not want it now   Wants to try Kenn Pichardo / will prescribe  & starting on Vit D with Co Q10   Today no c/o cp/SOB/edema/PND/MIKI     Hx mild CVA   12/16/2012  MRI   . Mild diffuse atrophy. Superimposed area of hyperintense T2 and   FLAIR signal demonstrated in the left corona radiata,   corresponding to area of abnormality on the recent CT scan of the   head. None of the MR imaging features suggest acuity. No evidence   of diffusion restriction. No evidence of hemosiderin on the   gradient echo series. Finding is favored represent a small lacunar   infarct, subacute to remote without evidence of any residual   diffusion restriction. No evidence of acute infarct, hemorrhage or   mass. DVTs in 2011  X two events   On xarelto / denies any bleeding   Discussed NSAIDS   / tylenol prn   2011 CVT   Findings consistent with subacute superficial vein   thrombosis involving the right greater saphenous vein. - No evidence of deep vein thrombosis involving the right   lower extremity and left common femoral vein. - Calf veins on the right not well visualized due to calf   implant. Isolated calf clot cannot absolutely be ruled   out.         2016 9/2016  VL ext.    1. Acute deep venous thrombosis of left popliteal and tibial veins    2.  No evidence of right common femoral venous thrombosis     Plan:  Cont xarelto hx DVT's  / no asa   states can't take any statins / discussed PCSK9 / does not want it now   Wants to try Kenn Pichardo / will prescribe  & starting on Vit D with Co Q10   Fu in 3 months with blood work    Time spent >30 minutes     Paul Huber APRN,CVNP

## 2020-04-08 NOTE — PATIENT INSTRUCTIONS
Plan:  Cont xarelto hx DVT's  / no asa   states can't take any statins / discussed PCSK9 / does not want it now   Wants to try Hayder Esteban / will prescribe  & starting on Vit D with Co Q10   Fu in 3 months with blood work    Time spent >30 minutes

## 2020-04-13 RX ORDER — AMLODIPINE BESYLATE 5 MG/1
TABLET ORAL
Qty: 30 TABLET | Refills: 4 | Status: SHIPPED | OUTPATIENT
Start: 2020-04-13 | End: 2020-09-09

## 2020-06-23 ENCOUNTER — TELEPHONE (OUTPATIENT)
Dept: CARDIOLOGY CLINIC | Age: 65
End: 2020-06-23

## 2020-06-23 NOTE — TELEPHONE ENCOUNTER
Cover my meds  [place] # [phone]   called with a Ref # [id number]  Needs prior authorization for Vascepa 1 gm. because it was denied.     Patient is Jacinto Baum 1 gram

## 2020-09-08 NOTE — TELEPHONE ENCOUNTER
Medication:   Requested Prescriptions     Pending Prescriptions Disp Refills    valsartan-hydroCHLOROthiazide (DIOVAN-HCT) 320-25 MG per tablet [Pharmacy Med Name: VALSARTAN-HCTZ 320-25 MG TAB] 30 tablet 3     Sig: TAKE ONE TABLET BY MOUTH DAILY    metFORMIN (GLUCOPHAGE) 500 MG tablet [Pharmacy Med Name: metFORMIN  MG TABLET] 90 tablet 3     Sig: TAKE 2 TABLETS BY MOUTH EVERY MORNING AND ONE TABLET EVERY EVENING.  amLODIPine (NORVASC) 5 MG tablet [Pharmacy Med Name: amLODIPine BESYLATE 5 MG TAB] 30 tablet 3     Sig: TAKE ONE TABLET BY MOUTH DAILY    pioglitazone (ACTOS) 30 MG tablet [Pharmacy Med Name: PIOGLITAZONE HCL 30 MG TABLET] 30 tablet 3     Sig: TAKE ONE TABLET BY MOUTH DAILY         Last appt: 1/21/2020   Next appt: Visit date not found    Last OARRS: No flowsheet data found.

## 2020-09-09 RX ORDER — PIOGLITAZONEHYDROCHLORIDE 30 MG/1
TABLET ORAL
Qty: 30 TABLET | Refills: 3 | Status: SHIPPED | OUTPATIENT
Start: 2020-09-09 | End: 2021-01-05

## 2020-09-09 RX ORDER — VALSARTAN AND HYDROCHLOROTHIAZIDE 320; 25 MG/1; MG/1
TABLET, FILM COATED ORAL
Qty: 30 TABLET | Refills: 3 | Status: SHIPPED | OUTPATIENT
Start: 2020-09-09 | End: 2021-01-05

## 2020-09-09 RX ORDER — AMLODIPINE BESYLATE 5 MG/1
TABLET ORAL
Qty: 30 TABLET | Refills: 3 | Status: SHIPPED | OUTPATIENT
Start: 2020-09-09 | End: 2021-01-05

## 2020-09-10 RX ORDER — SILDENAFIL 100 MG/1
100 TABLET, FILM COATED ORAL DAILY PRN
Qty: 30 TABLET | Refills: 5 | Status: SHIPPED | OUTPATIENT
Start: 2020-09-10

## 2020-10-01 ENCOUNTER — TELEPHONE (OUTPATIENT)
Dept: PRIMARY CARE CLINIC | Age: 65
End: 2020-10-01

## 2020-10-01 NOTE — TELEPHONE ENCOUNTER
I spoke to the patient. He said he was punctured by a key on a key ring with and it penetrated the skin of his wrist about 1/16 of an inch. The injury occurred on the ulnar side of the volar wrist he states that the following day the wrist was swollen and he could barely move his fingers because of swelling and moved into the hand.   He did not call us until today and now he tells me that spontaneously the swelling has gone down and he can move his fingers just fine apparently there is no redness and he said that at the wrist is 90% better than what it had been at its worst.  I told him that he should go to the emergency room should there be any worsening of this puncture any redness or lymphangitic streak and he agreed with that

## 2020-10-01 NOTE — TELEPHONE ENCOUNTER
----- Message from [facility] sent at 9/30/2020 11:58 AM EDT -----  Subject: Appointment Request    Reason for Call: Routine (Patient Request) No Script    QUESTIONS  Type of Appointment? Established Patient  Reason for appointment request? Requested Provider unavailable - Dr. Jaylin Burris or NP  Additional Information for Provider? Patient has a puncture wound in left   hand a piece of metal 1/8 inch went into it. 2015 was last tetnase shot. His wrist is swollen up and fingers not working as well. He's gets off of   work at 5:30. I suggested ER but he said have dr jackson.  ---------------------------------------------------------------------------  --------------  [address]  What is the best way for the office to contact you? OK to leave message on   voicemail  Preferred Call Back Phone Number? [phone]  ---------------------------------------------------------------------------  --------------  SCRIPT ANSWERS  Relationship to Patient? Self  Appointment reason? Symptomatic  Select script based on patient symptoms? Adult No Script  (Patient requests to see the provider urgently  today or tomorrow. )? No  (Is the patient requesting to see the provider for a procedure?)? No  (Is the patient requesting to be seen routinely (not today or tomorrow)? Yes  Have you been diagnosed with   tested for   or told that you are suspected of having COVID-19 (Coronavirus)? No  Have you had a fever or taken medication to treat a fever within the past   3 days? No  Have you had a cough   shortness of breath or flu-like symptoms within the past 3 days? No  Do you currently have flu-like symptoms including fever or chills   cough   shortness of breath   or difficulty breathing   or new loss of taste or smell? No  (Service Expert  click yes below to proceed with MYOMO As Usual   Scheduling)?  Yes

## 2020-10-01 NOTE — TELEPHONE ENCOUNTER
Pt called back and was told to go to the ER. He says he is feeling better and he is not really worried about it.

## 2021-01-05 RX ORDER — VALSARTAN AND HYDROCHLOROTHIAZIDE 320; 25 MG/1; MG/1
TABLET, FILM COATED ORAL
Qty: 30 TABLET | Refills: 0 | Status: SHIPPED | OUTPATIENT
Start: 2021-01-05 | End: 2021-01-13 | Stop reason: SDUPTHER

## 2021-01-05 RX ORDER — PIOGLITAZONEHYDROCHLORIDE 30 MG/1
TABLET ORAL
Qty: 30 TABLET | Refills: 0 | Status: SHIPPED | OUTPATIENT
Start: 2021-01-05 | End: 2021-01-13 | Stop reason: SDUPTHER

## 2021-01-05 RX ORDER — AMLODIPINE BESYLATE 5 MG/1
TABLET ORAL
Qty: 30 TABLET | Refills: 0 | Status: SHIPPED | OUTPATIENT
Start: 2021-01-05 | End: 2021-01-13 | Stop reason: SDUPTHER

## 2021-01-05 NOTE — TELEPHONE ENCOUNTER
Medication:   Requested Prescriptions     Pending Prescriptions Disp Refills    valsartan-hydroCHLOROthiazide (DIOVAN-HCT) 320-25 MG per tablet [Pharmacy Med Name: VALSARTAN-HCTZ 320-25 MG TAB] 30 tablet 2     Sig: TAKE ONE TABLET BY MOUTH DAILY    amLODIPine (NORVASC) 5 MG tablet [Pharmacy Med Name: amLODIPine BESYLATE 5 MG TAB] 30 tablet 2     Sig: TAKE ONE TABLET BY MOUTH DAILY    pioglitazone (ACTOS) 30 MG tablet [Pharmacy Med Name: PIOGLITAZONE HCL 30 MG TABLET] 30 tablet 2     Sig: TAKE ONE TABLET BY MOUTH DAILY    metFORMIN (GLUCOPHAGE) 500 MG tablet [Pharmacy Med Name: metFORMIN  MG TABLET] 90 tablet 2     Sig: TAKE TWO TABLETS BY MOUTH EVERY MORNING AND TAKE ONE TABLET BY MOUTH EVERY EVENING       Last appt: 1/21/2020   Next appt: Visit date not found    Last Labs DM:   Lab Results   Component Value Date    LABA1C 7.2 09/23/2019

## 2021-01-13 ENCOUNTER — OFFICE VISIT (OUTPATIENT)
Dept: PRIMARY CARE CLINIC | Age: 66
End: 2021-01-13
Payer: COMMERCIAL

## 2021-01-13 VITALS
TEMPERATURE: 97.2 F | HEART RATE: 74 BPM | DIASTOLIC BLOOD PRESSURE: 80 MMHG | WEIGHT: 198 LBS | BODY MASS INDEX: 26.85 KG/M2 | SYSTOLIC BLOOD PRESSURE: 144 MMHG

## 2021-01-13 DIAGNOSIS — Z12.5 SCREENING FOR PROSTATE CANCER: ICD-10-CM

## 2021-01-13 DIAGNOSIS — E11.9 TYPE 2 DIABETES MELLITUS WITHOUT COMPLICATION, WITHOUT LONG-TERM CURRENT USE OF INSULIN (HCC): Primary | ICD-10-CM

## 2021-01-13 DIAGNOSIS — E78.1 PURE HYPERTRIGLYCERIDEMIA: ICD-10-CM

## 2021-01-13 LAB — HBA1C MFR BLD: 8.2 %

## 2021-01-13 PROCEDURE — G8427 DOCREV CUR MEDS BY ELIG CLIN: HCPCS | Performed by: FAMILY MEDICINE

## 2021-01-13 PROCEDURE — G8419 CALC BMI OUT NRM PARAM NOF/U: HCPCS | Performed by: FAMILY MEDICINE

## 2021-01-13 PROCEDURE — 99214 OFFICE O/P EST MOD 30 MIN: CPT | Performed by: FAMILY MEDICINE

## 2021-01-13 PROCEDURE — 2022F DILAT RTA XM EVC RTNOPTHY: CPT | Performed by: FAMILY MEDICINE

## 2021-01-13 PROCEDURE — 3052F HG A1C>EQUAL 8.0%<EQUAL 9.0%: CPT | Performed by: FAMILY MEDICINE

## 2021-01-13 PROCEDURE — 1036F TOBACCO NON-USER: CPT | Performed by: FAMILY MEDICINE

## 2021-01-13 PROCEDURE — G8484 FLU IMMUNIZE NO ADMIN: HCPCS | Performed by: FAMILY MEDICINE

## 2021-01-13 PROCEDURE — 3017F COLORECTAL CA SCREEN DOC REV: CPT | Performed by: FAMILY MEDICINE

## 2021-01-13 PROCEDURE — 83036 HEMOGLOBIN GLYCOSYLATED A1C: CPT | Performed by: FAMILY MEDICINE

## 2021-01-13 RX ORDER — PIOGLITAZONEHYDROCHLORIDE 30 MG/1
30 TABLET ORAL DAILY
Qty: 30 TABLET | Refills: 5 | Status: SHIPPED | OUTPATIENT
Start: 2021-01-13 | End: 2021-08-06

## 2021-01-13 RX ORDER — VALSARTAN AND HYDROCHLOROTHIAZIDE 320; 25 MG/1; MG/1
1 TABLET, FILM COATED ORAL DAILY
Qty: 30 TABLET | Refills: 5 | Status: SHIPPED | OUTPATIENT
Start: 2021-01-13 | End: 2021-08-06

## 2021-01-13 RX ORDER — AMLODIPINE BESYLATE 5 MG/1
5 TABLET ORAL DAILY
Qty: 30 TABLET | Refills: 5 | Status: SHIPPED | OUTPATIENT
Start: 2021-01-13 | End: 2021-08-06

## 2021-01-13 NOTE — PATIENT INSTRUCTIONS
Add Januvia 50 mg daily  Refill other meds, add 81 mg aspirin daily  Check glucose daily at various times and log  The results  Return 1 month for BP and DM check  Get labs

## 2021-01-13 NOTE — PROGRESS NOTES
Mahogany Mayer (:  1955) is a 60 y.o. male,Established patient, here for evaluation of the following chief complaint(s):  Hypertension and Diabetes      ASSESSMENT/PLAN:  1. Type 2 diabetes mellitus without complication, without long-term current use of insulin (HCC)  -     POCT glycosylated hemoglobin (Hb A1C)  Elevated blood pressure  History of deep venous thrombosis    P: Add Januvia 50 mg daily  Refill other meds  Check glucose daily at various times and log  The results  Return 1 month for BP and DM check  Get labs    SUBJECTIVE/OBJECTIVE:  HPI  Gluc 130-150 throughout day  Feels fine now    Review of Systems    Physical Exam  Constitutional:       Appearance: Normal appearance. HENT:      Head: Normocephalic and atraumatic. Neck:      Thyroid: No thyromegaly. Vascular: No carotid bruit. Cardiovascular:      Rate and Rhythm: Normal rate and regular rhythm. Pulses:           Dorsalis pedis pulses are 1+ on the right side and 2+ on the left side. Posterior tibial pulses are 0 on the right side and 0 on the left side. Heart sounds: No murmur. Pulmonary:      Effort: Pulmonary effort is normal.      Breath sounds: Normal breath sounds. Abdominal:      General: Bowel sounds are normal.      Palpations: Abdomen is soft. There is no hepatomegaly or splenomegaly. Tenderness: There is no abdominal tenderness. Musculoskeletal:      Right lower leg: No edema. Left lower leg: No edema. Lymphadenopathy:      Cervical: No cervical adenopathy. Skin:     Comments: Skin of feet intact without ulcers, but thick and dry, leandro R   Neurological:      Mental Status: He is alert. Sensory: No sensory deficit (vib intact in halluces).    Psychiatric:         Mood and Affect: Mood normal.         Behavior: Behavior normal.       Vitals:    21 0847 21 0917   BP: (!) 147/83 (!) 144/80   Pulse: 74    Temp: 97.2 °F (36.2 °C)    Weight: 198 lb (89.8 kg) A1C=8.2    An electronic signature was used to authenticate this note.     --Marina Preciado MD

## 2021-01-15 ENCOUNTER — TELEPHONE (OUTPATIENT)
Dept: PRIMARY CARE CLINIC | Age: 66
End: 2021-01-15

## 2021-01-20 NOTE — TELEPHONE ENCOUNTER
Medication:   Requested Prescriptions     Pending Prescriptions Disp Refills    blood glucose test strips (ONETOUCH ULTRA) strip [Pharmacy Med Name: Abel Cloud TEST STRP] 50 strip 4     Sig: USE ONE STRIP TO TEST TWICE A DAY         Last appt: 1.13.21  Next appt: Visit date not found    Last OARRS: No flowsheet data found.

## 2021-01-21 RX ORDER — BLOOD SUGAR DIAGNOSTIC
STRIP MISCELLANEOUS
Qty: 50 STRIP | Refills: 4 | Status: SHIPPED | OUTPATIENT
Start: 2021-01-21

## 2021-01-25 DIAGNOSIS — E78.1 PURE HYPERTRIGLYCERIDEMIA: ICD-10-CM

## 2021-01-25 DIAGNOSIS — Z12.5 SCREENING FOR PROSTATE CANCER: ICD-10-CM

## 2021-01-25 DIAGNOSIS — E11.9 TYPE 2 DIABETES MELLITUS WITHOUT COMPLICATION, WITHOUT LONG-TERM CURRENT USE OF INSULIN (HCC): ICD-10-CM

## 2021-01-25 LAB
ANION GAP SERPL CALCULATED.3IONS-SCNC: 10 MMOL/L (ref 3–16)
BUN BLDV-MCNC: 22 MG/DL (ref 7–20)
CALCIUM SERPL-MCNC: 9.6 MG/DL (ref 8.3–10.6)
CHLORIDE BLD-SCNC: 98 MMOL/L (ref 99–110)
CHOLESTEROL, TOTAL: 243 MG/DL (ref 0–199)
CO2: 28 MMOL/L (ref 21–32)
CREAT SERPL-MCNC: 1.2 MG/DL (ref 0.8–1.3)
GFR AFRICAN AMERICAN: >60
GFR NON-AFRICAN AMERICAN: >60
GLUCOSE BLD-MCNC: 131 MG/DL (ref 70–99)
HDLC SERPL-MCNC: 53 MG/DL (ref 40–60)
LDL CHOLESTEROL CALCULATED: 161 MG/DL
POTASSIUM SERPL-SCNC: 4 MMOL/L (ref 3.5–5.1)
PROSTATE SPECIFIC ANTIGEN: 1.2 NG/ML (ref 0–4)
SODIUM BLD-SCNC: 136 MMOL/L (ref 136–145)
TRIGL SERPL-MCNC: 144 MG/DL (ref 0–150)
VLDLC SERPL CALC-MCNC: 29 MG/DL

## 2021-01-25 RX ORDER — PITAVASTATIN CALCIUM 2.09 MG/1
2 TABLET, FILM COATED ORAL NIGHTLY
Qty: 30 TABLET | Refills: 5 | Status: SHIPPED | OUTPATIENT
Start: 2021-01-25 | End: 2021-08-06

## 2021-01-26 ENCOUNTER — TELEPHONE (OUTPATIENT)
Dept: PRIMARY CARE CLINIC | Age: 66
End: 2021-01-26

## 2021-01-26 NOTE — TELEPHONE ENCOUNTER
Pt stated that someone called him. Pt thinks it is the same subject that he talked to Dr Li Mata about yesterday. If that is the case, pt states that there is no need to call him. He understands what Dr Li Mata and he discussed.

## 2021-03-08 ENCOUNTER — TELEPHONE (OUTPATIENT)
Dept: PRIMARY CARE CLINIC | Age: 66
End: 2021-03-08

## 2021-03-08 DIAGNOSIS — Z12.11 SCREEN FOR COLON CANCER: Primary | ICD-10-CM

## 2021-03-09 NOTE — TELEPHONE ENCOUNTER
Intermittent bright red blood per rectum over the last month associated with straining at stool. He said he is overdue for a colonoscopy and last saw Dr. Shawn Armijo. I will refer him to Dr. Shawn Armijo for a colonoscopy and try to expedite the evaluation.         Note  to staff: Please fax the referral to Dr. Tash Blount

## 2021-03-09 NOTE — TELEPHONE ENCOUNTER
Spoke with patient let him know that referral has been faxed and to get in contact with Dr. Sylvie Daniel office.

## 2021-07-06 NOTE — TELEPHONE ENCOUNTER
Medication:   Requested Prescriptions     Pending Prescriptions Disp Refills    metFORMIN (GLUCOPHAGE) 500 MG tablet [Pharmacy Med Name: metFORMIN  MG TABLET] 90 tablet 2     Sig: TAKE TWO TABLETS BY MOUTH EVERY MORNING AND TAKE ONE TABLET BY MOUTH EVERY EVENING       Last appt: 1/13/2021   Next appt: Visit date not found    Last Labs DM:   Lab Results   Component Value Date    LABA1C 8.2 01/13/2021

## 2021-07-16 NOTE — TELEPHONE ENCOUNTER
Medication:   Requested Prescriptions     Pending Prescriptions Disp Refills    TRADJENTA 5 MG tablet [Pharmacy Med Name: Niels Clinton 5 MG TABLET] 30 tablet 4     Sig: TAKE ONE TABLET BY MOUTH DAILY     Last Filled: 1.15.21    Last appt: 1/13/2021   Next appt: Visit date not found    Last OARRS: No flowsheet data found.

## 2021-07-17 RX ORDER — LINAGLIPTIN 5 MG/1
TABLET, FILM COATED ORAL
Qty: 30 TABLET | Refills: 4 | Status: SHIPPED | OUTPATIENT
Start: 2021-07-17 | End: 2021-07-20

## 2021-07-19 NOTE — TELEPHONE ENCOUNTER
Medication:   Requested Prescriptions     Pending Prescriptions Disp Refills    TRADJENTA 5 MG tablet [Pharmacy Med Name: Kota Valentino 5 MG TABLET] 30 tablet 4     Sig: TAKE ONE TABLET BY MOUTH DAILY       Last appt: 1/13/2021   Next appt: 7.21.21    Last Labs DM:   Lab Results   Component Value Date    LABA1C 8.2 01/13/2021

## 2021-07-20 RX ORDER — LINAGLIPTIN 5 MG/1
TABLET, FILM COATED ORAL
Qty: 30 TABLET | Refills: 4 | Status: SHIPPED | OUTPATIENT
Start: 2021-07-20 | End: 2021-07-21

## 2021-07-21 ENCOUNTER — OFFICE VISIT (OUTPATIENT)
Dept: PRIMARY CARE CLINIC | Age: 66
End: 2021-07-21
Payer: COMMERCIAL

## 2021-07-21 VITALS
HEART RATE: 85 BPM | BODY MASS INDEX: 27.04 KG/M2 | SYSTOLIC BLOOD PRESSURE: 108 MMHG | DIASTOLIC BLOOD PRESSURE: 78 MMHG | HEIGHT: 72 IN | WEIGHT: 199.6 LBS | OXYGEN SATURATION: 97 % | TEMPERATURE: 98.1 F

## 2021-07-21 DIAGNOSIS — E11.9 TYPE 2 DIABETES MELLITUS WITHOUT COMPLICATION, WITHOUT LONG-TERM CURRENT USE OF INSULIN (HCC): ICD-10-CM

## 2021-07-21 DIAGNOSIS — E78.1 PURE HYPERTRIGLYCERIDEMIA: Primary | ICD-10-CM

## 2021-07-21 LAB — HBA1C MFR BLD: 7.4 %

## 2021-07-21 PROCEDURE — 3017F COLORECTAL CA SCREEN DOC REV: CPT | Performed by: FAMILY MEDICINE

## 2021-07-21 PROCEDURE — 1036F TOBACCO NON-USER: CPT | Performed by: FAMILY MEDICINE

## 2021-07-21 PROCEDURE — 3051F HG A1C>EQUAL 7.0%<8.0%: CPT | Performed by: FAMILY MEDICINE

## 2021-07-21 PROCEDURE — G8419 CALC BMI OUT NRM PARAM NOF/U: HCPCS | Performed by: FAMILY MEDICINE

## 2021-07-21 PROCEDURE — 83036 HEMOGLOBIN GLYCOSYLATED A1C: CPT | Performed by: FAMILY MEDICINE

## 2021-07-21 PROCEDURE — 2022F DILAT RTA XM EVC RTNOPTHY: CPT | Performed by: FAMILY MEDICINE

## 2021-07-21 PROCEDURE — 99214 OFFICE O/P EST MOD 30 MIN: CPT | Performed by: FAMILY MEDICINE

## 2021-07-21 PROCEDURE — G8427 DOCREV CUR MEDS BY ELIG CLIN: HCPCS | Performed by: FAMILY MEDICINE

## 2021-07-21 SDOH — ECONOMIC STABILITY: INCOME INSECURITY: IN THE LAST 12 MONTHS, WAS THERE A TIME WHEN YOU WERE NOT ABLE TO PAY THE MORTGAGE OR RENT ON TIME?: NO

## 2021-07-21 SDOH — ECONOMIC STABILITY: TRANSPORTATION INSECURITY
IN THE PAST 12 MONTHS, HAS THE LACK OF TRANSPORTATION KEPT YOU FROM MEDICAL APPOINTMENTS OR FROM GETTING MEDICATIONS?: NO

## 2021-07-21 SDOH — ECONOMIC STABILITY: FOOD INSECURITY: WITHIN THE PAST 12 MONTHS, YOU WORRIED THAT YOUR FOOD WOULD RUN OUT BEFORE YOU GOT MONEY TO BUY MORE.: NEVER TRUE

## 2021-07-21 SDOH — ECONOMIC STABILITY: TRANSPORTATION INSECURITY
IN THE PAST 12 MONTHS, HAS LACK OF TRANSPORTATION KEPT YOU FROM MEETINGS, WORK, OR FROM GETTING THINGS NEEDED FOR DAILY LIVING?: NO

## 2021-07-21 SDOH — ECONOMIC STABILITY: FOOD INSECURITY: WITHIN THE PAST 12 MONTHS, THE FOOD YOU BOUGHT JUST DIDN'T LAST AND YOU DIDN'T HAVE MONEY TO GET MORE.: NEVER TRUE

## 2021-07-21 SDOH — ECONOMIC STABILITY: HOUSING INSECURITY: IN THE LAST 12 MONTHS, HOW MANY PLACES HAVE YOU LIVED?: 1

## 2021-07-21 SDOH — ECONOMIC STABILITY: HOUSING INSECURITY
IN THE LAST 12 MONTHS, WAS THERE A TIME WHEN YOU DID NOT HAVE A STEADY PLACE TO SLEEP OR SLEPT IN A SHELTER (INCLUDING NOW)?: NO

## 2021-07-21 ASSESSMENT — SOCIAL DETERMINANTS OF HEALTH (SDOH): HOW HARD IS IT FOR YOU TO PAY FOR THE VERY BASICS LIKE FOOD, HOUSING, MEDICAL CARE, AND HEATING?: NOT HARD AT ALL

## 2021-07-21 NOTE — PROGRESS NOTES
Maite Chaudhary (:  1955) is a 60 y.o. male,Established patient, here for evaluation of the following chief complaint(s):  No chief complaint on file. ASSESSMENT/PLAN:  Type 2 diabetes without complication and without long-term use of insulin  Diabetic control could be better with the addition of an SGLT2 inhibitor   will therefore change the [facility] to [facility], as long as his insurance approves. I explained the necessity of scrupulous urinary hygiene. Check a lipid panel and a urinalysis. The lipid panel has not been checked since he started the Livalo. Subjective   SUBJECTIVE/OBJECTIVE:  HPINo DM neuropathy  Over a year since last eye exam  Has changed his diet a lot, more veggies and fewer carbs. His weight has remained about the same but his blood sugars have decreased  Sugars range from 120-177  Objective   Physical Exam  Constitutional:       Appearance: Normal appearance. HENT:      Head: Normocephalic and atraumatic. Neck:      Thyroid: No thyromegaly. Vascular: No carotid bruit. Cardiovascular:      Rate and Rhythm: Normal rate and regular rhythm. Heart sounds: No murmur heard. Pulmonary:      Effort: Pulmonary effort is normal.      Breath sounds: Normal breath sounds. Abdominal:      General: Bowel sounds are normal.      Palpations: Abdomen is soft. There is no hepatomegaly or splenomegaly. Tenderness: There is no abdominal tenderness. Musculoskeletal:      Right lower leg: No edema. Left lower leg: No edema. Lymphadenopathy:      Cervical: No cervical adenopathy. Neurological:      Mental Status: He is alert.    Psychiatric:         Mood and Affect: Mood normal.         Behavior: Behavior normal.           Vitals:    21 1932   BP: 108/78   Pulse: 85   Temp: 98.1 °F (36.7 °C)   TempSrc: Oral   SpO2: 97%   Weight: 199 lb 9.6 oz (90.5 kg)   Height: 6' (1.829 m)     A1C = 7.4          An electronic signature was used to [employer]

## 2021-08-05 NOTE — TELEPHONE ENCOUNTER
Medication:   Requested Prescriptions     Pending Prescriptions Disp Refills    pioglitazone (ACTOS) 30 MG tablet [Pharmacy Med Name: PIOGLITAZONE HCL 30 MG TABLET] 30 tablet 4     Sig: TAKE ONE TABLET BY MOUTH DAILY    LIVALO 2 MG TABS tablet [Pharmacy Med Name: LIVALO 2 MG TABLET] 30 tablet 4     Sig: TAKE ONE TABLET BY MOUTH ONCE NIGHTLY    amLODIPine (NORVASC) 5 MG tablet [Pharmacy Med Name: amLODIPine BESYLATE 5 MG TAB] 30 tablet 4     Sig: TAKE ONE TABLET BY MOUTH DAILY    valsartan-hydroCHLOROthiazide (DIOVAN-HCT) 320-25 MG per tablet [Pharmacy Med Name: VALSARTAN-HCTZ 320-25 MG TAB] 30 tablet 4     Sig: TAKE ONE TABLET BY MOUTH DAILY       Last appt: 7/21/2021   Next appt: Visit date not found    Last Labs DM:   Lab Results   Component Value Date    LABA1C 7.4 07/21/2021

## 2021-08-06 RX ORDER — PIOGLITAZONEHYDROCHLORIDE 30 MG/1
TABLET ORAL
Qty: 30 TABLET | Refills: 4 | Status: SHIPPED | OUTPATIENT
Start: 2021-08-06 | End: 2022-01-10 | Stop reason: SDUPTHER

## 2021-08-06 RX ORDER — PITAVASTATIN CALCIUM 2.09 MG/1
TABLET, FILM COATED ORAL
Qty: 30 TABLET | Refills: 4 | Status: SHIPPED | OUTPATIENT
Start: 2021-08-06 | End: 2021-08-10

## 2021-08-06 RX ORDER — AMLODIPINE BESYLATE 5 MG/1
TABLET ORAL
Qty: 30 TABLET | Refills: 4 | Status: SHIPPED | OUTPATIENT
Start: 2021-08-06 | End: 2022-01-11 | Stop reason: SDUPTHER

## 2021-08-06 RX ORDER — VALSARTAN AND HYDROCHLOROTHIAZIDE 320; 25 MG/1; MG/1
TABLET, FILM COATED ORAL
Qty: 30 TABLET | Refills: 4 | Status: SHIPPED | OUTPATIENT
Start: 2021-08-06 | End: 2022-03-14

## 2021-08-10 DIAGNOSIS — E78.1 PURE HYPERTRIGLYCERIDEMIA: Primary | ICD-10-CM

## 2021-08-13 RX ORDER — RIVAROXABAN 20 MG/1
TABLET, FILM COATED ORAL
Qty: 30 TABLET | Refills: 11 | Status: SHIPPED | OUTPATIENT
Start: 2021-08-13 | End: 2022-08-09

## 2021-09-29 RX ORDER — SILDENAFIL 100 MG/1
TABLET, FILM COATED ORAL
Qty: 3 TABLET | OUTPATIENT
Start: 2021-09-29

## 2021-10-13 NOTE — TELEPHONE ENCOUNTER
Medication:   Requested Prescriptions     Pending Prescriptions Disp Refills    metFORMIN (GLUCOPHAGE) 500 MG tablet 90 tablet 2     Last Filled:  7.7.21    Last appt: 7/21/2021   Next appt: 10/27/2021    Last Labs DM:   Lab Results   Component Value Date    LABA1C 7.4 07/21/2021

## 2021-10-13 NOTE — TELEPHONE ENCOUNTER
Pt is out of his Metformin a request was put in from the pharmacy on 10/10/21 but he said its not at the [facility] pharmacy please advise he was told that he needs to schedule for a new provider and he did take an appointment with  on the 27th because he can get off work until then         metFORMIN (GLUCOPHAGE) 500 MG tablet     Harvinder Kenny [address] [facility] [address] [place] [address] [phone]   0 [address] [address]   Phone:  [phone]  Fax:  [phone]

## 2021-10-27 ENCOUNTER — OFFICE VISIT (OUTPATIENT)
Dept: PRIMARY CARE CLINIC | Age: 66
End: 2021-10-27
Payer: COMMERCIAL

## 2021-10-27 VITALS
HEART RATE: 83 BPM | BODY MASS INDEX: 26.07 KG/M2 | DIASTOLIC BLOOD PRESSURE: 80 MMHG | TEMPERATURE: 98.5 F | SYSTOLIC BLOOD PRESSURE: 110 MMHG | OXYGEN SATURATION: 96 % | WEIGHT: 192.2 LBS

## 2021-10-27 DIAGNOSIS — E11.9 TYPE 2 DIABETES MELLITUS WITHOUT COMPLICATION, WITHOUT LONG-TERM CURRENT USE OF INSULIN (HCC): ICD-10-CM

## 2021-10-27 DIAGNOSIS — Z11.59 NEED FOR HEPATITIS C SCREENING TEST: ICD-10-CM

## 2021-10-27 DIAGNOSIS — Z00.00 ENCOUNTER FOR ANNUAL PHYSICAL EXAM: Primary | ICD-10-CM

## 2021-10-27 DIAGNOSIS — Z11.4 SCREENING FOR HIV (HUMAN IMMUNODEFICIENCY VIRUS): ICD-10-CM

## 2021-10-27 DIAGNOSIS — I71.20 THORACIC AORTIC ANEURYSM WITHOUT RUPTURE: ICD-10-CM

## 2021-10-27 PROCEDURE — 99396 PREV VISIT EST AGE 40-64: CPT | Performed by: STUDENT IN AN ORGANIZED HEALTH CARE EDUCATION/TRAINING PROGRAM

## 2021-10-27 PROCEDURE — G8484 FLU IMMUNIZE NO ADMIN: HCPCS | Performed by: STUDENT IN AN ORGANIZED HEALTH CARE EDUCATION/TRAINING PROGRAM

## 2021-10-27 NOTE — PROGRESS NOTES
10/27/2021    Rosaura Ewing (:  1955) is a 60 y.o. male, here for a preventive medicine evaluation. Patient Active Problem List   Diagnosis    Hypertension    Mixed hyperlipidemia    Carotid stenosis, asymptomatic    Foot drop, right    Statin intolerance    Type 2 diabetes mellitus without complication, without long-term current use of insulin (HCC)    Thoracic aortic aneurysm without rupture (HCC)    Sensorineural hearing loss    Infantile cerebral palsy (HCC)    CAD in native artery    SVT (supraventricular tachycardia) (HCC)    Dilated aortic root (HCC)    Abnormal angiogram     T2DM  Glucose at home between 130-160 in mornings before he eats, improved from before. Asymptomatic    History of stroke at birth  Right sided slower than left at baseline    All other issues stable  Sees cardiologist for aortic aneurysm    Review of Systems   All other systems reviewed and are negative. Prior to Visit Medications    Medication Sig Taking?  Authorizing Provider   metFORMIN (GLUCOPHAGE) 500 MG tablet TAKE TWO TABLETS BY MOUTH EVERY MORNING AND TAKE ONE TABLET BY MOUTH EVERY EVENING Yes Cristino Sandhoff, MD   XARELTO 20 MG TABS tablet TAKE ONE TABLET BY MOUTH DAILY WITH SUPPER Yes Rufino Newton MD   pitavastatin (LIVALO) 4 MG TABS tablet Take 1 tablet by mouth nightly Yes Rufino Newton MD   pioglitazone (ACTOS) 30 MG tablet TAKE ONE TABLET BY MOUTH DAILY Yes Rufino Newton MD   amLODIPine (NORVASC) 5 MG tablet TAKE ONE TABLET BY MOUTH DAILY Yes Rufino Newton MD   valsartan-hydroCHLOROthiazide (DIOVAN-HCT) 320-25 MG per tablet TAKE ONE TABLET BY MOUTH DAILY Yes Rufino Newton MD   Empagliflozin-linaGLIPtin 10-5 MG TABS Take 1 tablet by mouth daily Yes Rufino Newton MD   blood glucose test strips (ONETOUCH ULTRA) strip USE ONE STRIP TO TEST TWICE A DAY Yes Rufino Newton MD   sildenafil (VIAGRA) 100 MG tablet Take 1 tablet by mouth daily as needed for Erectile Dysfunction Yes Buzz Oliver MD        Allergies   Allergen Reactions    Statins Other (See Comments)     Myalgias and cramping    Codeine Nausea And Vomiting       Past Medical History:   Diagnosis Date    [facility]     DVT (deep venous thrombosis) ([facility] 2012    L leg    DVT (deep venous thrombosis) ([facility] 2011    R leg    Hypertension     Statin intolerance 2/27/2017    Thoracic aortic aneurysm without rupture ([facility] 1/21/2020    Type 2 diabetes mellitus without complication, without long-term current use of insulin ([facility] 10/23/2017       Past Surgical History:   Procedure Laterality Date    CARDIOVASCULAR STRESS TEST  may 2011    normal    CARPAL TUNNEL RELEASE      FOOT SURGERY      club foot, right foot, 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  2/10    implant R calf,        Social History     Socioeconomic History    Marital status:      Spouse name: Not on file    Number of children: Not on file    Years of education: Not on file    Highest education level: Not on file   Occupational History    Occupation: farmer     Comment: corn, soybeans   Tobacco Use    Smoking status: Never Smoker    Smokeless tobacco: Never Used   Vaping Use    Vaping Use: Never used   Substance and Sexual Activity    Alcohol use: Yes     Comment: rare    Drug use: No    Sexual activity: Not Currently     Partners: Female   Other Topics Concern    Not on file   Social History Narrative    Not on file     Social Determinants of Health     Financial Resource Strain: Low Risk     Difficulty of Paying Living Expenses: Not hard at all   Food Insecurity: No Food Insecurity    Worried About Running Out of Food in the Last Year: Never true    Syed of Food in the Last Year: Never true   Transportation Needs: No Transportation Needs    Lack of Transportation (Medical): No    Lack of Transportation (Non-Medical):  No   Physical Activity:     Days of Exercise per Week:     Minutes of Exercise per Protective Sensation: 10 sites tested. 10 sites sensed. Skin:     General: Skin is warm. Neurological:      General: No focal deficit present. Mental Status: He is alert. Psychiatric:         Mood and Affect: Mood normal.         No flowsheet data found.     Lab Results   Component Value Date    CHOL 208 08/10/2021    CHOL 243 01/25/2021    CHOL 215 10/04/2019    CHOLFAST 233 02/20/2019    TRIG 138 08/10/2021    TRIG 144 01/25/2021    TRIG 143 10/04/2019    TRIGLYCFAST 158 02/20/2019    HDL 61 08/10/2021    HDL 53 01/25/2021    HDL 62 10/04/2019    LDLCALC 119 08/10/2021    LDLCALC 161 01/25/2021    LDLCALC 124 10/04/2019    GLUCOSE 131 01/25/2021    GLUCOSE 126 03/07/2012    LABA1C 7.4 07/21/2021    LABA1C 8.2 01/13/2021    LABA1C 7.2 09/23/2019       The 10-year ASCVD risk score (Tyler Councilman., et al., 2013) is: 17.9%    Values used to calculate the score:      Age: 60 years      Sex: Male      Is Non- : No      Diabetic: Yes      Tobacco smoker: No      Systolic Blood Pressure: 855 mmHg      Is BP treated: Yes      HDL Cholesterol: 61 mg/dL      Total Cholesterol: 208 mg/dL    Immunization History   Administered Date(s) Administered    COVID-19, Moderna, Primary or Immunocompromised, PF, 100mcg/0.5mL 03/12/2021, 04/09/2021    Tdap (Boostrix, Adacel) 08/31/2015       Health Maintenance   Topic Date Due    Hepatitis C screen  Never done    Pneumococcal 0-64 years Vaccine (1 of 2 - PPSV23) Never done    Diabetic retinal exam  Never done    HIV screen  Never done    Diabetic microalbuminuria test  Never done    Shingles Vaccine (1 of 2) Never done    Flu vaccine (1) Never done    Potassium monitoring  01/25/2022    Creatinine monitoring  01/25/2022    A1C test (Diabetic or Prediabetic)  07/21/2022    Lipid screen  08/10/2022    Diabetic foot exam  10/27/2022    DTaP/Tdap/Td vaccine (2 - Td or Tdap) 08/31/2025    Colon cancer screen colonoscopy  06/23/2031    COVID-19 Vaccine  Completed    Hepatitis A vaccine  Aged Out    Hib vaccine  Aged Out    Meningococcal (ACWY) vaccine  Aged Out          ASSESSMENT/PLAN:  1. Encounter for annual physical exam  2. Thoracic aortic aneurysm without rupture [facility])  Assessment & Plan:   Stable, managed by vascular    3. Type 2 diabetes mellitus without complication, without long-term current use of insulin ([facility])  Assessment & Plan:   Chronic well-controlled  Continue same medication empagliflozinlinagliptin 10-5 mg daily  Metformin 1500 mg total daily  Actos 30 mg daily  Hemoglobin A1c today was 6.9, at goal  Referral placed for diabetes eye exam  Orders:  -     AFL - Rivka Barbosa, OD, (Optometry) Optometry, [place]  -     MICROALBUMIN / CREATININE URINE RATIO; Future  -     Diabetic Foot Exam  4. Screening for HIV (human immunodeficiency virus)  -     HIV-1 AND HIV-2 ANTIBODIES; Future  5. Need for hepatitis C screening test  -     HEPATITIS C ANTIBODY; Future      No follow-ups on file. An electronic signature was used to authenticate this note.     --Binta Ugarte MD on 10/27/2021 at 12:25 PM

## 2021-10-27 NOTE — ASSESSMENT & PLAN NOTE
Chronic well-controlled  Continue same medication empagliflozinlinagliptin 10-5 mg daily  Metformin 1500 mg total daily  Actos 30 mg daily  Hemoglobin A1c today was 6.9, at goal  Referral placed for diabetes eye exam

## 2021-10-27 NOTE — PROGRESS NOTES
Vinh Booker (:  1955) is a 60 y.o. male,{New vs Established:[id number]::\"Established patient\"}, here for evaluation of the following chief complaint(s):  Establish Care, Diabetes, and Hyperlipidemia         ASSESSMENT/PLAN:  {There are no diagnoses linked to this encounter. (Refresh or delete this SmartLink)}    No follow-ups on file. Subjective   SUBJECTIVE/OBJECTIVE:  HPI    T2DM  Glucose at home between 130-160 in mornings before he eats, improved from before. History of stroke at birth  Right sided slower than left      Review of Systems       Objective   Physical Exam       {Time Documentation Optional:[id number]}      An electronic signature was used to authenticate this note.     --Christi Sinclair MD

## 2022-01-10 ENCOUNTER — TELEPHONE (OUTPATIENT)
Dept: FAMILY MEDICINE CLINIC | Age: 67
End: 2022-01-10

## 2022-01-10 RX ORDER — PIOGLITAZONEHYDROCHLORIDE 30 MG/1
TABLET ORAL
Qty: 30 TABLET | Refills: 4 | OUTPATIENT
Start: 2022-01-10

## 2022-01-10 RX ORDER — PIOGLITAZONEHYDROCHLORIDE 30 MG/1
TABLET ORAL
Qty: 90 TABLET | Refills: 1 | Status: SHIPPED | OUTPATIENT
Start: 2022-01-10 | End: 2022-06-27 | Stop reason: SDUPTHER

## 2022-01-10 NOTE — TELEPHONE ENCOUNTER
Medication:   Requested Prescriptions      No prescriptions requested or ordered in this encounter     Last Filled:  8.6.21    Last appt: Visit date not found   Next appt: Visit date not found    Last Labs DM:   Lab Results   Component Value Date    LABA1C 7.4 07/21/2021

## 2022-01-10 NOTE — TELEPHONE ENCOUNTER
Patient needs a refill on the following medication         pioglitazone (ACTOS) 30 MG tablet [[id number]]     Order Details  Dose, Route, Frequency: As Directed   Dispense Quantity: 30 tablet Refills: 4          Sig: TAKE ONE TABLET BY MOUTH DAILY           Pharmacy    [address] [phone] [facility] [phone]   [address] [address]   Phone:  [phone]  Fax:  [phone]     Thank you

## 2022-01-11 RX ORDER — AMLODIPINE BESYLATE 5 MG/1
TABLET ORAL
Qty: 30 TABLET | Refills: 4 | Status: SHIPPED | OUTPATIENT
Start: 2022-01-11 | End: 2022-06-13

## 2022-01-11 NOTE — TELEPHONE ENCOUNTER
Patient needs a refill on the following medication       amLODIPine (NORVASC) 5 MG tablet [[id number]]     Order Details  Dose, Route, Frequency: As Directed   Dispense Quantity: 30 tablet Refills: 4          Sig: TAKE ONE TABLET BY MOUTH DAILY           Pharmacy    [address] [phone] Dora Jung [phone]   [address], [address] [facility] [address]   Phone:  [phone]  Fax:  [phone]     Thank you

## 2022-01-11 NOTE — TELEPHONE ENCOUNTER
Medication:   Requested Prescriptions     Pending Prescriptions Disp Refills    amLODIPine (NORVASC) 5 MG tablet 30 tablet 4     Last Filled:  8.6.21    Last appt: 10/27/2021   Next appt: Visit date not found    Last OARRS: No flowsheet data found.

## 2022-01-26 NOTE — TELEPHONE ENCOUNTER
----- Message from Kiara Greenberg sent at 1/26/2022  8:01 AM EST -----  Subject: Message to Provider    QUESTIONS  Information for Provider? [address] [facility], [address] [place] [address],[address] [place] [address] [phone] - f [phone] pt called in and said he   needs glyxambi 10 mg refilled and it is not on medication list, please   follow up   ---------------------------------------------------------------------------  --------------  [address]  What is the best way for the office to contact you? Do not leave any   message, patient will call back for answer  Preferred Call Back Phone Number? [phone]  ---------------------------------------------------------------------------  --------------  SCRIPT ANSWERS  Relationship to Patient?  Self

## 2022-01-26 NOTE — TELEPHONE ENCOUNTER
Medication:   Requested Prescriptions     Pending Prescriptions Disp Refills    Empagliflozin-linaGLIPtin 10-5 MG TABS 30 tablet 5     Sig: Take 1 tablet by mouth daily     Last Filled:  7.21.21    Last appt: 10/27/2021   Next appt: Visit date not found    Last Labs DM:   Lab Results   Component Value Date    LABA1C 7.4 07/21/2021

## 2022-01-27 LAB — DIABETIC RETINOPATHY: NEGATIVE

## 2022-03-10 RX ORDER — PITAVASTATIN CALCIUM 4.18 MG/1
TABLET, FILM COATED ORAL
Qty: 30 TABLET | Refills: 5 | Status: SHIPPED | OUTPATIENT
Start: 2022-03-10 | End: 2022-09-24 | Stop reason: SDUPTHER

## 2022-03-10 NOTE — TELEPHONE ENCOUNTER
Medication:   Requested Prescriptions     Pending Prescriptions Disp Refills    LIVALO 4 MG TABS tablet [Pharmacy Med Name: LIVALO 4 MG TABLET] 30 tablet 5     Sig: TAKE ONE TABLET BY MOUTH ONCE NIGHTLY        Last Filled:      Patient Phone Number: [phone] (home)     Last appt: 10/27/2021   Next appt: Visit date not found    Last OARRS: No flowsheet data found.     Preferred Pharmacy:   [id number] - [address] [phone] Patsy Cano [phone]  [address] [place] [address]  Phone: [phone] Fax: [phone]    [address] [place] [address] Drive [address] [place] [address] [phone] Patsy Cano [phone]  [address] [place] [address]  Phone: [phone] Fax: [phone]

## 2022-03-14 RX ORDER — VALSARTAN AND HYDROCHLOROTHIAZIDE 320; 25 MG/1; MG/1
TABLET, FILM COATED ORAL
Qty: 90 TABLET | Refills: 1 | Status: SHIPPED | OUTPATIENT
Start: 2022-03-14 | End: 2022-09-12 | Stop reason: SDUPTHER

## 2022-03-14 NOTE — TELEPHONE ENCOUNTER
Medication:   Requested Prescriptions     Pending Prescriptions Disp Refills    valsartan-hydroCHLOROthiazide (DIOVAN-HCT) 320-25 MG per tablet [Pharmacy Med Name: VALSARTAN-HCTZ 320-25 MG TAB] 30 tablet 4     Sig: TAKE ONE TABLET BY MOUTH DAILY     Last Filled: 8.6.21  Last appt: 10/27/2021   Next appt: Visit date not found    Last OARRS: No flowsheet data found.

## 2022-05-16 DIAGNOSIS — E11.9 TYPE 2 DIABETES MELLITUS WITHOUT COMPLICATION, WITHOUT LONG-TERM CURRENT USE OF INSULIN (HCC): Primary | ICD-10-CM

## 2022-05-16 NOTE — TELEPHONE ENCOUNTER
Medication:   Requested Prescriptions     Pending Prescriptions Disp Refills    metFORMIN (GLUCOPHAGE) 500 MG tablet [Pharmacy Med Name: metFORMIN  MG TABLET] 90 tablet 0     Sig: TAKE TWO TABLETS BY MOUTH EVERY MORNING AND TAKE ONE TABLET BY MOUTH EVERY EVENING       Last Filled:      Patient Phone Number: [phone] (home)     Last appt: 10/27/2021   Next appt: 6/13/2022    Last Labs DM:   Lab Results   Component Value Date    LABA1C 7.4 07/21/2021       Last OARRS: No flowsheet data found.     Preferred Pharmacy:   [facility] #16 - [address] [phone] - F [phone]  [address] [place] [address]  Phone: [phone] Fax: [phone]    [facility] [id number] - [address] [phone] [facility] [phone]  [facility] [address]  Phone: [phone] Fax: [phone]

## 2022-05-17 NOTE — TELEPHONE ENCOUNTER
He needs to have labs taht I ordered previously done before I can refill anymore
Medication:   Requested Prescriptions     Pending Prescriptions Disp Refills    metFORMIN (GLUCOPHAGE) 500 MG tablet 90 tablet 5     Last Filled:  11/12/2021    Last appt: 10/27/2021   Next appt: Visit date not found    Last OARRS: No flowsheet data found.
Medication:   Requested Prescriptions     Pending Prescriptions Disp Refills    metFORMIN (GLUCOPHAGE) 500 MG tablet 90 tablet 5     Last Filled: 11.12.21    Last appt: 10/27/2021   Next appt: Visit date not found    Last OARRS: No flowsheet data found.
Sent mcm
[phone]

## 2022-05-27 DIAGNOSIS — E11.9 TYPE 2 DIABETES MELLITUS WITHOUT COMPLICATION, WITHOUT LONG-TERM CURRENT USE OF INSULIN (HCC): ICD-10-CM

## 2022-05-27 DIAGNOSIS — Z11.59 NEED FOR HEPATITIS C SCREENING TEST: ICD-10-CM

## 2022-05-27 DIAGNOSIS — Z11.4 SCREENING FOR HIV (HUMAN IMMUNODEFICIENCY VIRUS): ICD-10-CM

## 2022-05-27 LAB
ANION GAP SERPL CALCULATED.3IONS-SCNC: 18 MMOL/L (ref 3–16)
BUN BLDV-MCNC: 25 MG/DL (ref 7–20)
CALCIUM SERPL-MCNC: 9.3 MG/DL (ref 8.3–10.6)
CHLORIDE BLD-SCNC: 100 MMOL/L (ref 99–110)
CO2: 20 MMOL/L (ref 21–32)
CREAT SERPL-MCNC: 1.1 MG/DL (ref 0.8–1.3)
CREATININE URINE: 46.9 MG/DL (ref 39–259)
GFR AFRICAN AMERICAN: >60
GFR NON-AFRICAN AMERICAN: >60
GLUCOSE BLD-MCNC: 125 MG/DL (ref 70–99)
HEPATITIS C ANTIBODY INTERPRETATION: NORMAL
MICROALBUMIN UR-MCNC: <1.2 MG/DL
MICROALBUMIN/CREAT UR-RTO: NORMAL MG/G (ref 0–30)
POTASSIUM SERPL-SCNC: 4.1 MMOL/L (ref 3.5–5.1)
SODIUM BLD-SCNC: 138 MMOL/L (ref 136–145)

## 2022-05-28 LAB
ESTIMATED AVERAGE GLUCOSE: 148.5 MG/DL
HBA1C MFR BLD: 6.8 %
HIV AG/AB: NORMAL
HIV ANTIGEN: NORMAL
HIV-1 ANTIBODY: NORMAL
HIV-2 AB: NORMAL

## 2022-06-13 ENCOUNTER — OFFICE VISIT (OUTPATIENT)
Dept: PRIMARY CARE CLINIC | Age: 67
End: 2022-06-13
Payer: COMMERCIAL

## 2022-06-13 VITALS
BODY MASS INDEX: 26.37 KG/M2 | OXYGEN SATURATION: 98 % | HEART RATE: 88 BPM | SYSTOLIC BLOOD PRESSURE: 112 MMHG | DIASTOLIC BLOOD PRESSURE: 76 MMHG | WEIGHT: 194.4 LBS

## 2022-06-13 DIAGNOSIS — I71.20 THORACIC AORTIC ANEURYSM WITHOUT RUPTURE: Primary | ICD-10-CM

## 2022-06-13 DIAGNOSIS — E11.9 TYPE 2 DIABETES MELLITUS WITHOUT COMPLICATION, WITHOUT LONG-TERM CURRENT USE OF INSULIN (HCC): ICD-10-CM

## 2022-06-13 PROCEDURE — 1036F TOBACCO NON-USER: CPT | Performed by: STUDENT IN AN ORGANIZED HEALTH CARE EDUCATION/TRAINING PROGRAM

## 2022-06-13 PROCEDURE — 2022F DILAT RTA XM EVC RTNOPTHY: CPT | Performed by: STUDENT IN AN ORGANIZED HEALTH CARE EDUCATION/TRAINING PROGRAM

## 2022-06-13 PROCEDURE — 3017F COLORECTAL CA SCREEN DOC REV: CPT | Performed by: STUDENT IN AN ORGANIZED HEALTH CARE EDUCATION/TRAINING PROGRAM

## 2022-06-13 PROCEDURE — G8417 CALC BMI ABV UP PARAM F/U: HCPCS | Performed by: STUDENT IN AN ORGANIZED HEALTH CARE EDUCATION/TRAINING PROGRAM

## 2022-06-13 PROCEDURE — 99214 OFFICE O/P EST MOD 30 MIN: CPT | Performed by: STUDENT IN AN ORGANIZED HEALTH CARE EDUCATION/TRAINING PROGRAM

## 2022-06-13 PROCEDURE — 1123F ACP DISCUSS/DSCN MKR DOCD: CPT | Performed by: STUDENT IN AN ORGANIZED HEALTH CARE EDUCATION/TRAINING PROGRAM

## 2022-06-13 PROCEDURE — G8427 DOCREV CUR MEDS BY ELIG CLIN: HCPCS | Performed by: STUDENT IN AN ORGANIZED HEALTH CARE EDUCATION/TRAINING PROGRAM

## 2022-06-13 PROCEDURE — 3044F HG A1C LEVEL LT 7.0%: CPT | Performed by: STUDENT IN AN ORGANIZED HEALTH CARE EDUCATION/TRAINING PROGRAM

## 2022-06-13 RX ORDER — AMLODIPINE BESYLATE 5 MG/1
TABLET ORAL
Qty: 90 TABLET | Refills: 3 | Status: SHIPPED | OUTPATIENT
Start: 2022-06-13

## 2022-06-13 ASSESSMENT — PATIENT HEALTH QUESTIONNAIRE - PHQ9
1. LITTLE INTEREST OR PLEASURE IN DOING THINGS: 0
SUM OF ALL RESPONSES TO PHQ QUESTIONS 1-9: 0
SUM OF ALL RESPONSES TO PHQ9 QUESTIONS 1 & 2: 0
2. FEELING DOWN, DEPRESSED OR HOPELESS: 0

## 2022-06-13 NOTE — TELEPHONE ENCOUNTER
Medication:   Requested Prescriptions     Pending Prescriptions Disp Refills    amLODIPine (NORVASC) 5 MG tablet [Pharmacy Med Name: amLODIPine BESYLATE 5 MG TAB] 30 tablet 4     Sig: TAKE ONE TABLET BY MOUTH DAILY     Last Filled:  1.11.22    Last appt: 10/27/2021   Next appt: 6/13/2022    Last OARRS: No flowsheet data found.

## 2022-06-13 NOTE — TELEPHONE ENCOUNTER
Medication:   Requested Prescriptions     Pending Prescriptions Disp Refills    metFORMIN (GLUCOPHAGE) 500 MG tablet [Pharmacy Med Name: metFORMIN  MG TABLET] 90 tablet 0     Sig: TAKE TWO TABLETS BY MOUTH EVERY MORNING AND TAKE ONE TABLET BY MOUTH EVERY EVENING     Last Filled:  5.16.22    Last appt: 10/27/2021   Next appt: 6/13/2022    Last Labs DM:   Lab Results   Component Value Date    LABA1C 6.8 05/27/2022

## 2022-06-27 RX ORDER — PIOGLITAZONEHYDROCHLORIDE 30 MG/1
TABLET ORAL
Qty: 90 TABLET | Refills: 1 | Status: SHIPPED | OUTPATIENT
Start: 2022-06-27

## 2022-06-27 NOTE — TELEPHONE ENCOUNTER
Medication:   Requested Prescriptions     Pending Prescriptions Disp Refills    pioglitazone (ACTOS) 30 MG tablet 90 tablet 1     Sig: TAKE ONE TABLET BY MOUTH DAILY     Last Filled:  1.10.22    Last appt: 6/13/2022   Next appt: Visit date not found    Last Labs DM:   Lab Results   Component Value Date    LABA1C 6.8 05/27/2022

## 2022-07-12 NOTE — TELEPHONE ENCOUNTER
Medication:   Requested Prescriptions     Pending Prescriptions Disp Refills    metFORMIN (GLUCOPHAGE) 500 MG tablet [Pharmacy Med Name: metFORMIN  MG TABLET] 90 tablet 0     Sig: TAKE TWO TABLETS BY MOUTH EVERY MORNING AND TAKE ONE TABLET BY MOUTH EVERY EVENING     Last Filled:  6/13/2022    Last appt: 6/13/2022   Next appt: Visit date not found    Last OARRS: No flowsheet data found.

## 2022-07-25 NOTE — TELEPHONE ENCOUNTER
Medication:   Requested Prescriptions     Pending Prescriptions Disp Refills    GLYXAMBI 10-5 MG TABS [Pharmacy Med Name: GLYXAMBI 10 MG-5 MG TABLET] 30 tablet 5     Sig: TAKE ONE TABLET BY MOUTH DAILY     Last Filled:      Last appt: 6/13/2022   Next appt: Visit date not found    Last OARRS: No flowsheet data found.

## 2022-07-26 RX ORDER — EMPAGLIFLOZIN AND LINAGLIPTIN 10; 5 MG/1; MG/1
TABLET, FILM COATED ORAL
Qty: 90 TABLET | Refills: 2 | Status: SHIPPED | OUTPATIENT
Start: 2022-07-26

## 2022-08-09 RX ORDER — RIVAROXABAN 20 MG/1
TABLET, FILM COATED ORAL
Qty: 90 TABLET | Refills: 3 | Status: SHIPPED | OUTPATIENT
Start: 2022-08-09

## 2022-08-09 NOTE — TELEPHONE ENCOUNTER
Medication:   Requested Prescriptions     Pending Prescriptions Disp Refills    XARELTO 20 MG TABS tablet [Pharmacy Med Name: Garald Diver 20 MG TABLET] 30 tablet 11     Sig: TAKE ONE TABLET BY MOUTH DAILY WITH SUPPER        Last Filled:      Patient Phone Number: [phone] (home)     Last appt: 6/13/2022   Next appt: Visit date not found    Last OARRS: No flowsheet data found. Preferred Pharmacy:   [facility] - Sally Cesar, [address] [address]  [address] [facility] [address]  Phone: [phone] Fax: [phone]    [facility] [id number] - Sally Olmos, [address] [facility] [phone] Illa Delay [phone]  [facility] [address]  Phone: [phone] Fax: [phone]    Medication:   Requested Prescriptions     Pending Prescriptions Disp Refills    XARELTO 20 MG TABS tablet [Pharmacy Med Name: Garald Diver 20 MG TABLET] 30 tablet 11     Sig: TAKE ONE TABLET BY MOUTH DAILY WITH SUPPER        Last Filled:      Patient Phone Number: [phone] (home)     Last appt: 6/13/2022   Next appt: Visit date not found    Last OARRS: No flowsheet data found.     Preferred Pharmacy:   [facility] - [address] [phone] - F [phone]  [address] [facility] [address]  Phone: [phone] Fax: [phone]    [facility] [id number] - Sally Olmos, [address] [facility] [phone] Illa Delay [phone]  [facility] [address]  Phone: [phone] Fax: [phone]

## 2022-08-09 NOTE — TELEPHONE ENCOUNTER
Medication:   Requested Prescriptions     Pending Prescriptions Disp Refills    metFORMIN (GLUCOPHAGE) 500 MG tablet [Pharmacy Med Name: metFORMIN  MG TABLET] 90 tablet 0     Sig: TAKE TWO TABLETS BY MOUTH EVERY MORNING AND TAKE ONE TABLET BY MOUTH EVERY EVENING       Last Filled:      Patient Phone Number: [phone] (home)     Last appt: 6/13/2022   Next appt: 8/9/2022    Last Labs DM:   Lab Results   Component Value Date/Time    LABA1C 6.8 05/27/2022 07:39 AM       Last OARRS: No flowsheet data found. Preferred Pharmacy:   [facility] - Melani Jacquie, [address] [address]  Phone: [phone] Fax: [phone]    [address] [id number] - Melani Knappea, [address] [phone] Campos Sanches [phone]  [facility] [address]  Phone: [phone] Fax: [phone]  Medication:   Requested Prescriptions     Pending Prescriptions Disp Refills    metFORMIN (GLUCOPHAGE) 500 MG tablet [Pharmacy Med Name: metFORMIN  MG TABLET] 90 tablet 0     Sig: TAKE TWO TABLETS BY MOUTH EVERY MORNING AND TAKE ONE TABLET BY MOUTH EVERY EVENING       Last Filled:      Patient Phone Number: [phone] (home)     Last appt: 6/13/2022   Next appt: 8/9/2022    Last Labs DM:   Lab Results   Component Value Date/Time    LABA1C 6.8 05/27/2022 07:39 AM       Last OARRS: No flowsheet data found.     Preferred Pharmacy:   [facility] - [address] [phone] - F [phone]  [facility] [address]  Phone: [phone] Fax: [phone]    [address] [id number] - Melani Jacquie, [address] [phone] Campos Sanches [phone]  [facility] [address]  Phone: [phone] Fax: [phone]

## 2022-09-12 RX ORDER — VALSARTAN AND HYDROCHLOROTHIAZIDE 320; 25 MG/1; MG/1
TABLET, FILM COATED ORAL
Qty: 90 TABLET | Refills: 1 | Status: SHIPPED | OUTPATIENT
Start: 2022-09-12

## 2022-09-24 DIAGNOSIS — E78.2 MIXED HYPERLIPIDEMIA: Primary | ICD-10-CM

## 2022-09-26 RX ORDER — PITAVASTATIN CALCIUM 4.18 MG/1
4 TABLET, FILM COATED ORAL NIGHTLY
Qty: 30 TABLET | Refills: 5 | Status: SHIPPED | OUTPATIENT
Start: 2022-09-26

## 2022-09-26 NOTE — TELEPHONE ENCOUNTER
Medication:   Requested Prescriptions     Pending Prescriptions Disp Refills    pitavastatin (LIVALO) 4 MG TABS tablet 30 tablet 5     Last Filled: 8.10.21    Last appt: 6/13/2022   Next appt: Visit date not found    Last Lipid:   Lab Results   Component Value Date/Time    CHOL 208 08/10/2021 07:33 AM    TRIG 138 08/10/2021 07:33 AM    HDL 61 08/10/2021 07:33 AM    LDLCALC 119 08/10/2021 07:33 AM

## 2022-10-10 NOTE — TELEPHONE ENCOUNTER
Called patient [phone]. Talked with patient. Patient exhibited clear understanding that he needs to get his lab work done.

## 2022-10-31 DIAGNOSIS — E78.2 MIXED HYPERLIPIDEMIA: ICD-10-CM

## 2022-10-31 LAB
ALBUMIN SERPL-MCNC: 4.1 G/DL (ref 3.4–5)
ALP BLD-CCNC: 59 U/L (ref 40–129)
ALT SERPL-CCNC: 11 U/L (ref 10–40)
AST SERPL-CCNC: 11 U/L (ref 15–37)
BILIRUB SERPL-MCNC: 0.4 MG/DL (ref 0–1)
BILIRUBIN DIRECT: <0.2 MG/DL (ref 0–0.3)
BILIRUBIN, INDIRECT: ABNORMAL MG/DL (ref 0–1)
CHOLESTEROL, FASTING: 185 MG/DL (ref 0–199)
HDLC SERPL-MCNC: 60 MG/DL (ref 40–60)
LDL CHOLESTEROL CALCULATED: 100 MG/DL
TOTAL PROTEIN: 6.6 G/DL (ref 6.4–8.2)
TRIGLYCERIDE, FASTING: 123 MG/DL (ref 0–150)
VLDLC SERPL CALC-MCNC: 25 MG/DL

## 2022-12-02 ENCOUNTER — TELEPHONE (OUTPATIENT)
Dept: ADMINISTRATIVE | Age: 67
End: 2022-12-02

## 2022-12-21 RX ORDER — PIOGLITAZONEHYDROCHLORIDE 30 MG/1
TABLET ORAL
Qty: 90 TABLET | Refills: 1 | Status: SHIPPED | OUTPATIENT
Start: 2022-12-21

## 2022-12-21 NOTE — TELEPHONE ENCOUNTER
Medication:   Requested Prescriptions     Pending Prescriptions Disp Refills    pioglitazone (ACTOS) 30 MG tablet [Pharmacy Med Name: PIOGLITAZONE HCL 30 MG TABLET] 90 tablet 1     Sig: TAKE ONE TABLET BY MOUTH DAILY       Last Filled:      Patient Phone Number: [phone] (home)     Last appt: 6/13/2022   Next appt: Visit date not found    Last Labs DM:   Lab Results   Component Value Date/Time    LABA1C 6.8 05/27/2022 07:39 AM       Last OARRS: No flowsheet data found. Preferred Pharmacy:   [facility] - Willam Katz, [address] [address] [phone]  [place]  Phone: [phone] Fax: [phone]    [facility] [id number] - Jericayudelka Gianna, [address] [facility],[address] [phone] Winifred Fernandez [phone]  [address] [place] [address] Drive  [address] [place], [place] [id number]  Phone: [phone] Fax: [phone]:   Requested Prescriptions     Pending Prescriptions Disp Refills    pioglitazone (ACTOS) 30 MG tablet [Pharmacy Med Name: PIOGLITAZONE HCL 30 MG TABLET] 90 tablet 1     Sig: TAKE ONE TABLET BY MOUTH DAILY       Last Filled:      Patient Phone Number: [phone] (home)     Last appt: 6/13/2022   Next appt: Visit date not found    Last Labs DM:   Lab Results   Component Value Date/Time    LABA1C 6.8 05/27/2022 07:39 AM       Last OARRS: No flowsheet data found.     Preferred Pharmacy:   [facility] - [address] [phone] - F [phone]  [facility] [address]  Phone: [phone] Fax: [phone]    [facility] [id number]  Willam Gianna, [address] [facility],[address] [phone] Winifred Fernandez [phone]  [facility] [address]  Phone: [phone] Fax: [phone]

## 2023-02-02 DIAGNOSIS — E11.9 TYPE 2 DIABETES MELLITUS WITHOUT COMPLICATION, WITHOUT LONG-TERM CURRENT USE OF INSULIN (HCC): Primary | ICD-10-CM

## 2023-02-02 NOTE — TELEPHONE ENCOUNTER
Medication:   Requested Prescriptions     Pending Prescriptions Disp Refills    metFORMIN (GLUCOPHAGE) 500 MG tablet [Pharmacy Med Name: metFORMIN  MG TABLET] 270 tablet 1     Sig: TAKE TWO TABLETS BY MOUTH EVERY MORNING AND TAKE ONE TABLET BY MOUTH EVERY EVENING     Last Filled:  8/9/22    Last appt: 6/13/2022   Next appt: Visit date not found    Last Labs DM:   Lab Results   Component Value Date/Time    LABA1C 6.8 05/27/2022 07:39 AM

## 2023-02-06 DIAGNOSIS — E11.9 TYPE 2 DIABETES MELLITUS WITHOUT COMPLICATION, WITHOUT LONG-TERM CURRENT USE OF INSULIN (HCC): ICD-10-CM

## 2023-02-06 LAB
ANION GAP SERPL CALCULATED.3IONS-SCNC: 16 MMOL/L (ref 3–16)
BILIRUBIN URINE: NEGATIVE
BLOOD, URINE: NEGATIVE
BUN BLDV-MCNC: 21 MG/DL (ref 7–20)
CALCIUM SERPL-MCNC: 9.4 MG/DL (ref 8.3–10.6)
CHLORIDE BLD-SCNC: 102 MMOL/L (ref 99–110)
CLARITY: CLEAR
CO2: 22 MMOL/L (ref 21–32)
COLOR: YELLOW
CREAT SERPL-MCNC: 1.2 MG/DL (ref 0.8–1.3)
CREATININE URINE: 129.2 MG/DL (ref 39–259)
GFR SERPL CREATININE-BSD FRML MDRD: >60 ML/MIN/{1.73_M2}
GLUCOSE BLD-MCNC: 149 MG/DL (ref 70–99)
GLUCOSE URINE: 250 MG/DL
KETONES, URINE: NEGATIVE MG/DL
LEUKOCYTE ESTERASE, URINE: NEGATIVE
MICROALBUMIN UR-MCNC: <1.2 MG/DL
MICROALBUMIN/CREAT UR-RTO: NORMAL MG/G (ref 0–30)
MICROSCOPIC EXAMINATION: ABNORMAL
NITRITE, URINE: NEGATIVE
PH UA: 5.5 (ref 5–8)
POTASSIUM SERPL-SCNC: 4.2 MMOL/L (ref 3.5–5.1)
PROTEIN UA: NEGATIVE MG/DL
SODIUM BLD-SCNC: 140 MMOL/L (ref 136–145)
SPECIFIC GRAVITY UA: 1.04 (ref 1–1.03)
TSH REFLEX FT4: 2.58 UIU/ML (ref 0.27–4.2)
URINE REFLEX TO CULTURE: ABNORMAL
URINE TYPE: ABNORMAL
UROBILINOGEN, URINE: 0.2 E.U./DL

## 2023-02-07 LAB
ESTIMATED AVERAGE GLUCOSE: 154.2 MG/DL
HBA1C MFR BLD: 7 %

## 2023-02-09 ENCOUNTER — OFFICE VISIT (OUTPATIENT)
Dept: PRIMARY CARE CLINIC | Age: 68
End: 2023-02-09
Payer: COMMERCIAL

## 2023-02-09 VITALS
WEIGHT: 192.2 LBS | BODY MASS INDEX: 26.03 KG/M2 | TEMPERATURE: 98 F | SYSTOLIC BLOOD PRESSURE: 125 MMHG | HEIGHT: 72 IN | OXYGEN SATURATION: 97 % | HEART RATE: 72 BPM | DIASTOLIC BLOOD PRESSURE: 80 MMHG

## 2023-02-09 DIAGNOSIS — I71.20 THORACIC AORTIC ANEURYSM WITHOUT RUPTURE, UNSPECIFIED PART: Primary | ICD-10-CM

## 2023-02-09 DIAGNOSIS — E11.9 TYPE 2 DIABETES MELLITUS WITHOUT COMPLICATION, WITHOUT LONG-TERM CURRENT USE OF INSULIN (HCC): ICD-10-CM

## 2023-02-09 DIAGNOSIS — I10 PRIMARY HYPERTENSION: ICD-10-CM

## 2023-02-09 PROCEDURE — 3074F SYST BP LT 130 MM HG: CPT | Performed by: STUDENT IN AN ORGANIZED HEALTH CARE EDUCATION/TRAINING PROGRAM

## 2023-02-09 PROCEDURE — 2022F DILAT RTA XM EVC RTNOPTHY: CPT | Performed by: STUDENT IN AN ORGANIZED HEALTH CARE EDUCATION/TRAINING PROGRAM

## 2023-02-09 PROCEDURE — G8427 DOCREV CUR MEDS BY ELIG CLIN: HCPCS | Performed by: STUDENT IN AN ORGANIZED HEALTH CARE EDUCATION/TRAINING PROGRAM

## 2023-02-09 PROCEDURE — 1123F ACP DISCUSS/DSCN MKR DOCD: CPT | Performed by: STUDENT IN AN ORGANIZED HEALTH CARE EDUCATION/TRAINING PROGRAM

## 2023-02-09 PROCEDURE — 99214 OFFICE O/P EST MOD 30 MIN: CPT | Performed by: STUDENT IN AN ORGANIZED HEALTH CARE EDUCATION/TRAINING PROGRAM

## 2023-02-09 PROCEDURE — 3051F HG A1C>EQUAL 7.0%<8.0%: CPT | Performed by: STUDENT IN AN ORGANIZED HEALTH CARE EDUCATION/TRAINING PROGRAM

## 2023-02-09 PROCEDURE — 1036F TOBACCO NON-USER: CPT | Performed by: STUDENT IN AN ORGANIZED HEALTH CARE EDUCATION/TRAINING PROGRAM

## 2023-02-09 PROCEDURE — G8417 CALC BMI ABV UP PARAM F/U: HCPCS | Performed by: STUDENT IN AN ORGANIZED HEALTH CARE EDUCATION/TRAINING PROGRAM

## 2023-02-09 PROCEDURE — 3078F DIAST BP <80 MM HG: CPT | Performed by: STUDENT IN AN ORGANIZED HEALTH CARE EDUCATION/TRAINING PROGRAM

## 2023-02-09 PROCEDURE — G8484 FLU IMMUNIZE NO ADMIN: HCPCS | Performed by: STUDENT IN AN ORGANIZED HEALTH CARE EDUCATION/TRAINING PROGRAM

## 2023-02-09 PROCEDURE — 3017F COLORECTAL CA SCREEN DOC REV: CPT | Performed by: STUDENT IN AN ORGANIZED HEALTH CARE EDUCATION/TRAINING PROGRAM

## 2023-02-09 SDOH — ECONOMIC STABILITY: INCOME INSECURITY: HOW HARD IS IT FOR YOU TO PAY FOR THE VERY BASICS LIKE FOOD, HOUSING, MEDICAL CARE, AND HEATING?: NOT HARD AT ALL

## 2023-02-09 SDOH — ECONOMIC STABILITY: FOOD INSECURITY: WITHIN THE PAST 12 MONTHS, THE FOOD YOU BOUGHT JUST DIDN'T LAST AND YOU DIDN'T HAVE MONEY TO GET MORE.: NEVER TRUE

## 2023-02-09 SDOH — ECONOMIC STABILITY: FOOD INSECURITY: WITHIN THE PAST 12 MONTHS, YOU WORRIED THAT YOUR FOOD WOULD RUN OUT BEFORE YOU GOT MONEY TO BUY MORE.: NEVER TRUE

## 2023-02-09 ASSESSMENT — PATIENT HEALTH QUESTIONNAIRE - PHQ9
SUM OF ALL RESPONSES TO PHQ QUESTIONS 1-9: 0
SUM OF ALL RESPONSES TO PHQ QUESTIONS 1-9: 0
SUM OF ALL RESPONSES TO PHQ9 QUESTIONS 1 & 2: 0
SUM OF ALL RESPONSES TO PHQ QUESTIONS 1-9: 0
SUM OF ALL RESPONSES TO PHQ QUESTIONS 1-9: 0
1. LITTLE INTEREST OR PLEASURE IN DOING THINGS: 0
2. FEELING DOWN, DEPRESSED OR HOPELESS: 0

## 2023-02-09 NOTE — PATIENT INSTRUCTIONS
Blood Pressure  - Please take blood pressure twice daily around the same time everyday, if your blood pressure is below 100/70 then will need to stop norvasc, but call me and let me know what your readings are    Diabetes-  mediterranean diet, decrease carbs and sugar  - goal fasting glucose under 130 and HgA1C under 7    Cholesterol  - look on website for coupon and mediterranean diet

## 2023-02-09 NOTE — PROGRESS NOTES
Leon Villavicencio (:  1955) is a 78 y.o. male,Established patient, here for evaluation of the following chief complaint(s):  Blood Pressure Check (Medication renewals. )         ASSESSMENT/PLAN:  1. Thoracic aortic aneurysm without rupture, unspecified part  Chronic  We will get CT chest to evaluate stability of known thoracic aneurysm  We will also evaluate abdomen for potential abdominal aneurysm  -     CT CHEST ABDOMEN PELVIS WO CONTRAST Additional Contrast? Radiologist Recommendation; Future  2. Type 2 diabetes mellitus without complication, without long-term current use of insulin (HCC)  Chronic  Hemoglobin A1c is increased to 7.0 from 6.8, dietary changes    3. Primary hypertension  Chronic well-controlled  We will need to check blood pressure at home make sure is not going too low    Return in about 6 months (around 2023), or if symptoms worsen or fail to improve. Subjective   SUBJECTIVE/OBJECTIVE:  HPI    Asymptomatic, has been laid off from work but getting back to it and will improve diet and exercise, needs CT chest to eval aneurysm but asymptomatic. Review of Systems   All other systems reviewed and are negative. Objective   Physical Exam  Vitals reviewed. Constitutional:       General: He is not in acute distress. Appearance: Normal appearance. He is not ill-appearing, toxic-appearing or diaphoretic. HENT:      Head: Normocephalic and atraumatic. Right Ear: External ear normal.      Left Ear: External ear normal.      Nose: Nose normal.      Mouth/Throat:      Mouth: Mucous membranes are moist.   Eyes:      Extraocular Movements: Extraocular movements intact. Cardiovascular:      Rate and Rhythm: Normal rate and regular rhythm. Heart sounds: Normal heart sounds. No murmur heard. No friction rub. No gallop. Pulmonary:      Effort: Pulmonary effort is normal.      Breath sounds: Normal breath sounds. No wheezing, rhonchi or rales.    Abdominal: General: Abdomen is flat. Bowel sounds are normal. There is no distension. Palpations: Abdomen is soft. Comments: Diastases recti   Musculoskeletal:      Cervical back: Normal range of motion. Skin:     General: Skin is warm. Neurological:      General: No focal deficit present. Mental Status: He is alert. Psychiatric:         Mood and Affect: Mood normal.                An electronic signature was used to authenticate this note.     --Camilla Conti MD

## 2023-02-21 ENCOUNTER — HOSPITAL ENCOUNTER (OUTPATIENT)
Dept: CT IMAGING | Age: 68
Discharge: HOME | End: 2023-02-21
Payer: COMMERCIAL

## 2023-02-21 DIAGNOSIS — I71.20 THORACIC AORTIC ANEURYSM WITHOUT RUPTURE, UNSPECIFIED PART: ICD-10-CM

## 2023-02-21 PROCEDURE — 74176 CT ABD & PELVIS W/O CONTRAST: CPT

## 2023-03-08 RX ORDER — VALSARTAN AND HYDROCHLOROTHIAZIDE 320; 25 MG/1; MG/1
TABLET, FILM COATED ORAL
Qty: 90 TABLET | Refills: 1 | Status: SHIPPED | OUTPATIENT
Start: 2023-03-08

## 2023-03-26 DIAGNOSIS — E78.2 MIXED HYPERLIPIDEMIA: ICD-10-CM

## 2023-03-27 RX ORDER — PITAVASTATIN CALCIUM 4.18 MG/1
TABLET, FILM COATED ORAL
Qty: 30 TABLET | Refills: 5 | Status: SHIPPED | OUTPATIENT
Start: 2023-03-27

## 2023-04-19 RX ORDER — EMPAGLIFLOZIN AND LINAGLIPTIN 10; 5 MG/1; MG/1
TABLET, FILM COATED ORAL
Qty: 90 TABLET | Refills: 1 | Status: SHIPPED | OUTPATIENT
Start: 2023-04-19

## 2023-04-19 NOTE — TELEPHONE ENCOUNTER
Medication:   Requested Prescriptions     Pending Prescriptions Disp Refills    GLYXAMBI 10-5 MG TABS [Pharmacy Med Name: GLYXAMBI 10 MG-5 MG TABLET] 30 tablet      Sig: TAKE ONE TABLET BY MOUTH DAILY    metFORMIN (GLUCOPHAGE) 500 MG tablet [Pharmacy Med Name: metFORMIN  MG TABLET] 51 tablet      Sig: TAKE TWO TABLETS BY MOUTH EVERY MORNING TAKE ONE TABLET BY MOUTH EVERY EVENING     Last Filled:  07/26/2022, 02/02/2023    Last appt: 2/9/2023   Next appt: Visit date not found    Last OARRS: No flowsheet data found.

## 2023-06-05 RX ORDER — AMLODIPINE BESYLATE 5 MG/1
TABLET ORAL
Qty: 90 TABLET | Refills: 3 | Status: SHIPPED | OUTPATIENT
Start: 2023-06-05

## 2023-06-05 NOTE — TELEPHONE ENCOUNTER
Medication:   Requested Prescriptions     Pending Prescriptions Disp Refills    amLODIPine (NORVASC) 5 MG tablet [Pharmacy Med Name: amLODIPine BESYLATE 5 MG TAB] 90 tablet 3     Sig: TAKE ONE TABLET BY MOUTH DAILY     Last Filled:  06/13/2022    Last appt: 2/9/2023   Next appt: Visit date not found    Last OARRS: No flowsheet data found.

## 2023-06-19 RX ORDER — PIOGLITAZONEHYDROCHLORIDE 30 MG/1
TABLET ORAL
Qty: 79 TABLET | Refills: 0 | Status: SHIPPED | OUTPATIENT
Start: 2023-06-19

## 2023-06-19 NOTE — TELEPHONE ENCOUNTER
Medication:   Requested Prescriptions     Pending Prescriptions Disp Refills    pioglitazone (ACTOS) 30 MG tablet [Pharmacy Med Name: PIOGLITAZONE HCL 30 MG TABLET] 79 tablet      Sig: TAKE ONE TABLET BY MOUTH DAILY     Last Filled:  12/21/22    Last appt: 2/9/2023   Next appt: Visit date not found    Last Labs DM:   Lab Results   Component Value Date/Time    LABA1C 7.0 02/06/2023 08:51 PM     Last Lipid:   Lab Results   Component Value Date/Time    CHOL 208 08/10/2021 07:33 AM    TRIG 138 08/10/2021 07:33 AM    HDL 60 10/31/2022 07:37 AM    LDLCALC 100 10/31/2022 07:37 AM     Last PSA:   Lab Results   Component Value Date/Time    PSA 1.20 01/25/2021 07:37 AM     Last Thyroid: No results found for: TSH, FT3, R6ODGDX, Joycelyn Ores, F9FJBPS

## 2023-08-10 NOTE — TELEPHONE ENCOUNTER
----- Message from Steve Kendall sent at 8/10/2023  3:51 PM EDT -----  Subject: Refill Request    QUESTIONS  Name of Medication? XARELTO 20 MG TABS tablet  Patient-reported dosage and instructions? TAKE ONE TABLET BY MOUTH DAILY   WITH SUPPER  How many days do you have left? 8  Preferred Pharmacy? [facility] [id number]  Pharmacy phone number (if available)? [phone]  ---------------------------------------------------------------------------  --------------  Beulah DOVE  What is the best way for the office to contact you? OK to leave message on   voicemail  Preferred Call Back Phone Number? [phone]  ---------------------------------------------------------------------------  --------------  SCRIPT ANSWERS  Relationship to Patient?  Self

## 2023-08-10 NOTE — TELEPHONE ENCOUNTER
Medication:   Requested Prescriptions     Pending Prescriptions Disp Refills    rivaroxaban (XARELTO) 20 MG TABS tablet 90 tablet 3     Last Filled:  8/9/22    Last appt: 2/9/2023   Next appt: Visit date not found    Last Labs DM:   Lab Results   Component Value Date/Time    LABA1C 7.0 02/06/2023 08:51 PM     Last Lipid:   Lab Results   Component Value Date/Time    CHOL 208 08/10/2021 07:33 AM    TRIG 138 08/10/2021 07:33 AM    HDL 60 10/31/2022 07:37 AM    LDLCALC 100 10/31/2022 07:37 AM     Last PSA:   Lab Results   Component Value Date/Time    PSA 1.20 01/25/2021 07:37 AM     Last Thyroid: No results found for: TSH, FT3, Z8PZLZH, T4FREE, F3JYETK

## 2023-08-18 RX ORDER — VALSARTAN AND HYDROCHLOROTHIAZIDE 320; 25 MG/1; MG/1
TABLET, FILM COATED ORAL
Qty: 30 TABLET | Refills: 0 | Status: SHIPPED | OUTPATIENT
Start: 2023-08-18

## 2023-08-18 NOTE — TELEPHONE ENCOUNTER
Medication:   Requested Prescriptions     Pending Prescriptions Disp Refills    valsartan-hydroCHLOROthiazide (DIOVAN-HCT) 320-25 MG per tablet [Pharmacy Med Name: VALSARTAN-HCTZ 320-25 MG TAB] 30 tablet      Sig: TAKE ONE TABLET BY MOUTH DAILY     Last Filled:  3/8/23    Last appt: 2/9/2023   Next appt: Visit date not found    Last Labs DM:   Lab Results   Component Value Date/Time    LABA1C 7.0 02/06/2023 08:51 PM     Last Lipid:   Lab Results   Component Value Date/Time    CHOL 208 08/10/2021 07:33 AM    TRIG 138 08/10/2021 07:33 AM    HDL 60 10/31/2022 07:37 AM    LDLCALC 100 10/31/2022 07:37 AM     Last PSA:   Lab Results   Component Value Date/Time    PSA 1.20 01/25/2021 07:37 AM     Last Thyroid: No results found for: TSH, FT3, T6DVJGE, T4FREE, U0LEGOJ

## 2023-08-21 RX ORDER — PIOGLITAZONEHYDROCHLORIDE 30 MG/1
TABLET ORAL
Qty: 30 TABLET | Refills: 0 | Status: SHIPPED | OUTPATIENT
Start: 2023-08-21

## 2023-08-21 NOTE — TELEPHONE ENCOUNTER
Patient needs office visit. Left message for him to call back and make the appt     Medication:   Requested Prescriptions     Pending Prescriptions Disp Refills    pioglitazone (ACTOS) 30 MG tablet [Pharmacy Med Name: PIOGLITAZONE HCL 30 MG TABLET] 30 tablet      Sig: TAKE 1 TABLET BY MOUTH DAILY       Last Filled:  06/19/23    Patient Phone Number: [phone] (home)     Last appt: 2/9/2023   Next appt: Visit date not found    Last Labs DM:   Lab Results   Component Value Date/Time    LABA1C 7.0 02/06/2023 08:51 PM       Last OARRS: No flowsheet data found.     Preferred Pharmacy:   [facility] #16 - [address] [phone] - F [phone]  [address] [facility] [address] [address]  Phone: [phone] Fax: [phone]    [facility] [id number] - [address] [address] [address], [address] [place] [address] Road [phone] Miller Aguiar [phone]  [address] [place] [address]  Phone: [phone] Fax: [phone]

## 2023-08-22 ENCOUNTER — TELEPHONE (OUTPATIENT)
Dept: PRIMARY CARE CLINIC | Age: 68
End: 2023-08-22

## 2023-08-22 DIAGNOSIS — E11.9 TYPE 2 DIABETES MELLITUS WITHOUT COMPLICATION, WITHOUT LONG-TERM CURRENT USE OF INSULIN (HCC): Primary | ICD-10-CM

## 2023-08-22 DIAGNOSIS — I10 PRIMARY HYPERTENSION: ICD-10-CM

## 2023-08-22 NOTE — TELEPHONE ENCOUNTER
----- Message from Aishwarya Mcknight sent at 8/22/2023  9:23 AM EDT -----  Subject: Referral Request    Reason for referral request? labs for appointment on 9/15/23  Provider patient wants to be referred to(if known):     Provider Phone Number(if known):     Additional Information for Provider?   ---------------------------------------------------------------------------  --------------  Ester [place]    [phone]; OK to leave message on voicemail  ---------------------------------------------------------------------------  --------------

## 2023-08-22 NOTE — TELEPHONE ENCOUNTER
Please advise patient that Dr. Sally Vogt is not in the office however he just had labs done in February, the only labs that I can think she would like to repeat his A1c and possibly a CBC, BMP which I will order in chart.   Does not need to fast    Dr. Sally Vogt should be back in the office after the Labor Day weekend and might be able to add more labs if needed    Dr. Tony Champion

## 2023-09-15 ENCOUNTER — OFFICE VISIT (OUTPATIENT)
Dept: PRIMARY CARE CLINIC | Age: 68
End: 2023-09-15
Payer: COMMERCIAL

## 2023-09-15 VITALS
SYSTOLIC BLOOD PRESSURE: 122 MMHG | OXYGEN SATURATION: 98 % | DIASTOLIC BLOOD PRESSURE: 60 MMHG | BODY MASS INDEX: 25.67 KG/M2 | HEART RATE: 86 BPM | TEMPERATURE: 97.6 F | WEIGHT: 190.6 LBS

## 2023-09-15 DIAGNOSIS — E11.9 TYPE 2 DIABETES MELLITUS WITHOUT COMPLICATION, WITHOUT LONG-TERM CURRENT USE OF INSULIN (HCC): Primary | ICD-10-CM

## 2023-09-15 DIAGNOSIS — E78.2 MIXED HYPERLIPIDEMIA: ICD-10-CM

## 2023-09-15 DIAGNOSIS — I10 PRIMARY HYPERTENSION: ICD-10-CM

## 2023-09-15 PROCEDURE — 2022F DILAT RTA XM EVC RTNOPTHY: CPT | Performed by: STUDENT IN AN ORGANIZED HEALTH CARE EDUCATION/TRAINING PROGRAM

## 2023-09-15 PROCEDURE — 99214 OFFICE O/P EST MOD 30 MIN: CPT | Performed by: STUDENT IN AN ORGANIZED HEALTH CARE EDUCATION/TRAINING PROGRAM

## 2023-09-15 PROCEDURE — 3044F HG A1C LEVEL LT 7.0%: CPT | Performed by: STUDENT IN AN ORGANIZED HEALTH CARE EDUCATION/TRAINING PROGRAM

## 2023-09-15 PROCEDURE — 3017F COLORECTAL CA SCREEN DOC REV: CPT | Performed by: STUDENT IN AN ORGANIZED HEALTH CARE EDUCATION/TRAINING PROGRAM

## 2023-09-15 PROCEDURE — G8427 DOCREV CUR MEDS BY ELIG CLIN: HCPCS | Performed by: STUDENT IN AN ORGANIZED HEALTH CARE EDUCATION/TRAINING PROGRAM

## 2023-09-15 PROCEDURE — 1123F ACP DISCUSS/DSCN MKR DOCD: CPT | Performed by: STUDENT IN AN ORGANIZED HEALTH CARE EDUCATION/TRAINING PROGRAM

## 2023-09-15 PROCEDURE — 3074F SYST BP LT 130 MM HG: CPT | Performed by: STUDENT IN AN ORGANIZED HEALTH CARE EDUCATION/TRAINING PROGRAM

## 2023-09-15 PROCEDURE — G8417 CALC BMI ABV UP PARAM F/U: HCPCS | Performed by: STUDENT IN AN ORGANIZED HEALTH CARE EDUCATION/TRAINING PROGRAM

## 2023-09-15 PROCEDURE — 3078F DIAST BP <80 MM HG: CPT | Performed by: STUDENT IN AN ORGANIZED HEALTH CARE EDUCATION/TRAINING PROGRAM

## 2023-09-15 PROCEDURE — 1036F TOBACCO NON-USER: CPT | Performed by: STUDENT IN AN ORGANIZED HEALTH CARE EDUCATION/TRAINING PROGRAM

## 2023-09-15 NOTE — PROGRESS NOTES
Eleonora German (:  1955) is a 78 y.o. male,Established patient, here for evaluation of the following chief complaint(s):  Follow-up (Declined flu) and Diabetes         ASSESSMENT/PLAN:  1. Type 2 diabetes mellitus without complication, without long-term current use of insulin (HCC)  Assessment & Plan:  Chronic well controlled  Continue current regimen    2. Primary hypertension  Assessment & Plan:   Chronic well controlled  Continue current regimen    3. Mixed hyperlipidemia  Assessment & Plan:   Chronic well controlled  Continue current regimen        Return in about 6 months (around 3/15/2024) for chronic problems. Subjective   SUBJECTIVE/OBJECTIVE:  HPI    Asymptomatic doing well  Has been trying to eat healthier    Review of Systems   All other systems reviewed and are negative. Objective   Physical Exam  Vitals reviewed. Constitutional:       General: He is not in acute distress. Appearance: Normal appearance. He is not ill-appearing, toxic-appearing or diaphoretic. HENT:      Head: Normocephalic and atraumatic. Right Ear: External ear normal.      Left Ear: External ear normal.      Nose: Nose normal.      Mouth/Throat:      Mouth: Mucous membranes are moist.   Eyes:      Extraocular Movements: Extraocular movements intact. Cardiovascular:      Rate and Rhythm: Normal rate and regular rhythm. Heart sounds: Normal heart sounds. No murmur heard. No friction rub. No gallop. Pulmonary:      Effort: Pulmonary effort is normal.      Breath sounds: Normal breath sounds. No wheezing, rhonchi or rales. Musculoskeletal:      Cervical back: Normal range of motion. Skin:     General: Skin is warm. Neurological:      General: No focal deficit present. Mental Status: He is alert. Psychiatric:         Mood and Affect: Mood normal.                  An electronic signature was used to authenticate this note.     --Gladis Solis MD

## 2023-09-25 RX ORDER — PIOGLITAZONEHYDROCHLORIDE 30 MG/1
TABLET ORAL
Qty: 30 TABLET | Refills: 0 | Status: SHIPPED | OUTPATIENT
Start: 2023-09-25

## 2023-09-25 RX ORDER — VALSARTAN AND HYDROCHLOROTHIAZIDE 320; 25 MG/1; MG/1
1 TABLET, FILM COATED ORAL DAILY
Qty: 30 TABLET | Refills: 0 | Status: SHIPPED | OUTPATIENT
Start: 2023-09-25

## 2023-09-25 NOTE — TELEPHONE ENCOUNTER
Medication Refill Requests  1) pioglitazone (ACTOS) 30 MG tablet [[id number]]   2) valsartan-hydroCHLOROthiazide (DIOVAN-HCT) 320-25 MG per tablet     *Last OV 9/15/2023  *Patient states out-of-medication    Please send to:  [facility] [id number] [facility], [address] [phone] - F [phone]   [address] [place] [address]   Phone:  [phone]  Fax:  [phone]

## 2023-09-25 NOTE — TELEPHONE ENCOUNTER
Medication:   Requested Prescriptions     Pending Prescriptions Disp Refills    pioglitazone (ACTOS) 30 MG tablet 30 tablet 0     Sig: TAKE 1 TABLET BY MOUTH DAILY    valsartan-hydroCHLOROthiazide (DIOVAN-HCT) 320-25 MG per tablet 30 tablet 0     Sig: Take 1 tablet by mouth daily        Last Filled:      Patient Phone Number: [phone] (home)     Last appt: 9/15/2023   Next appt: Visit date not found    Last OARRS:        No data to display                Preferred Pharmacy:   [facility] #16 - Bisi Sandoval, [address] [phone] - F [phone]  [address] [facility] [address] [address]  Phone: [phone] Fax: [phone]    [facility] [id number]  Bisi Sandoval, [address] [place] [phone] - F [phone]  [address] [place] [address]  Phone: [phone] Fax: [phone]

## 2023-10-23 NOTE — TELEPHONE ENCOUNTER
Medication:   Requested Prescriptions     Pending Prescriptions Disp Refills    pioglitazone (ACTOS) 30 MG tablet [Pharmacy Med Name: PIOGLITAZONE HCL 30 MG TABLET] 30 tablet 0     Sig: TAKE 1 TABLET BY MOUTH DAILY    valsartan-hydroCHLOROthiazide (DIOVAN-HCT) 320-25 MG per tablet [Pharmacy Med Name: VALSARTAN-HCTZ 320-25 MG TAB] 30 tablet 0     Sig: TAKE 1 TABLET BY MOUTH DAILY     Last Filled:  valsartan - 9/25/2023  Pioglitazone - 9/25/2023    Last appt: 9/15/2023   Next appt: Visit date not found    Last OARRS:        No data to display

## 2023-10-24 RX ORDER — PIOGLITAZONEHYDROCHLORIDE 30 MG/1
TABLET ORAL
Qty: 30 TABLET | Refills: 0 | Status: SHIPPED | OUTPATIENT
Start: 2023-10-24

## 2023-10-24 RX ORDER — VALSARTAN AND HYDROCHLOROTHIAZIDE 320; 25 MG/1; MG/1
1 TABLET, FILM COATED ORAL DAILY
Qty: 30 TABLET | Refills: 0 | Status: SHIPPED | OUTPATIENT
Start: 2023-10-24

## 2023-10-26 RX ORDER — EMPAGLIFLOZIN AND LINAGLIPTIN 10; 5 MG/1; MG/1
TABLET, FILM COATED ORAL
Qty: 30 TABLET | Refills: 0 | Status: SHIPPED | OUTPATIENT
Start: 2023-10-26

## 2023-10-26 NOTE — TELEPHONE ENCOUNTER
Medication:   Requested Prescriptions     Pending Prescriptions Disp Refills    GLYXAMBI 10-5 MG TABS [Pharmacy Med Name: GLYXAMBI 10 MG-5 MG TABLET] 30 tablet      Sig: TAKE ONE TABLET BY MOUTH DAILY    metFORMIN (GLUCOPHAGE) 500 MG tablet [Pharmacy Med Name: metFORMIN  MG TABLET] 90 tablet      Sig: TAKE TWO TABLETS BY MOUTH EVERY MORNING TAKE ONE TABLET Landen Choudhary EVENING     Last Filled:  4.19.23    Last appt: 9/15/2023   Next appt: Visit date not found    Last Labs DM:   Lab Results   Component Value Date/Time    LABA1C 6.8 09/09/2023 08:29 AM

## 2023-11-27 RX ORDER — VALSARTAN AND HYDROCHLOROTHIAZIDE 320; 25 MG/1; MG/1
1 TABLET, FILM COATED ORAL DAILY
Qty: 30 TABLET | Refills: 2 | Status: SHIPPED | OUTPATIENT
Start: 2023-11-27

## 2023-11-27 RX ORDER — PIOGLITAZONEHYDROCHLORIDE 30 MG/1
30 TABLET ORAL DAILY
Qty: 30 TABLET | Refills: 2 | Status: SHIPPED | OUTPATIENT
Start: 2023-11-27

## 2023-11-27 NOTE — TELEPHONE ENCOUNTER
Pt needs meds refill.  Lov 9/15/23      valsartan-hydroCHLOROthiazide (DIOVAN-HCT) 320-25 MG per table     pioglitazone (ACTOS) 30 MG tablet [[id number]]     Pharmacy    [facility] [id number] [address] [phone] Zechariah Salvador [phone]  [address] [place] [address], [address] [place] [address]  Phone: [phone]  Fax: [phone]

## 2023-11-27 NOTE — TELEPHONE ENCOUNTER
Medication:   Requested Prescriptions     Pending Prescriptions Disp Refills    valsartan-hydroCHLOROthiazide (DIOVAN-HCT) 320-25 MG per tablet 30 tablet 0     Sig: Take 1 tablet by mouth daily    pioglitazone (ACTOS) 30 MG tablet 30 tablet 0     Sig: Take 1 tablet by mouth daily        Last Filled:      Patient Phone Number: [phone] (home)     Last appt: 9/15/2023   Next appt: Visit date not found    Last OARRS:        No data to display                Preferred Pharmacy:   [id number] - [place], [address] - F [phone]  [address] [address]  Phone: [phone] Fax: [phone]    [facility] [id number] - [place], [address] [phone] - F [phone]  [address] [place] [address],[address] [address]  Phone: [phone] Fax: [phone]

## 2023-11-27 NOTE — TELEPHONE ENCOUNTER
Medication:   Requested Prescriptions     Pending Prescriptions Disp Refills    valsartan-hydroCHLOROthiazide (DIOVAN-HCT) 320-25 MG per tablet 30 tablet 0     Sig: Take 1 tablet by mouth daily    pioglitazone (ACTOS) 30 MG tablet 30 tablet 0     Sig: Take 1 tablet by mouth daily     Last Filled:  10.24.23 10.24.23    Last appt: 9/15/2023   Next appt: Visit date not found    Last OARRS:        No data to display

## 2023-12-01 RX ORDER — EMPAGLIFLOZIN AND LINAGLIPTIN 10; 5 MG/1; MG/1
1 TABLET, FILM COATED ORAL DAILY
Qty: 90 TABLET | Refills: 0 | Status: SHIPPED | OUTPATIENT
Start: 2023-12-01

## 2023-12-01 NOTE — TELEPHONE ENCOUNTER
Pt is needing refills he states he is out of both medications     GLYXAMBI 10-5 MG TABS [[id number]]     metFORMIN (GLUCOPHAGE) 500 MG tablet [[id number]]     [facility] [id number] - [place] - [address] - P [phone] Larisa Alexis [phone]  [address] , [address] [place] [address]  Phone: [phone]  Fax: [phone]

## 2023-12-01 NOTE — TELEPHONE ENCOUNTER
Medication:   Requested Prescriptions     Pending Prescriptions Disp Refills    Empagliflozin-linaGLIPtin (GLYXAMBI) 10-5 MG TABS 30 tablet 0     Sig: Take 1 tablet by mouth daily    metFORMIN (GLUCOPHAGE) 500 MG tablet 90 tablet 0     Sig: TAKE TWO TABLETS BY MOUTH EVERY MORNING TAKE ONE TABLET BYMOUTH EVERY EVENING     Last Filled:  10.26.23 10.26.23    Last appt: 9/15/2023   Next appt: Visit date not found    Last OARRS:        No data to display

## 2024-01-02 NOTE — TELEPHONE ENCOUNTER
Medication:   Requested Prescriptions     Pending Prescriptions Disp Refills    metFORMIN (GLUCOPHAGE) 500 MG tablet [Pharmacy Med Name: metFORMIN  MG TABLET] 90 tablet 0     Sig: TAKE 2 TABLETS BY MOUTH EVERY MORNING AND TAKE ONE TABLET BY MOUTH EVERY EVENING     Last Filled:  12/1/2023    Last appt: 12/18/2023   Next appt: Visit date not found    Last Labs DM:   Lab Results   Component Value Date/Time    LABA1C 6.8 09/09/2023 08:29 AM

## 2024-02-02 NOTE — TELEPHONE ENCOUNTER
Medication:   Requested Prescriptions     Pending Prescriptions Disp Refills    metFORMIN (GLUCOPHAGE) 500 MG tablet 90 tablet 0     Sig: TAKE 2 TABLETS BY MOUTH EVERY MORNING AND TAKE ONE TABLET BY MOUTH EVERY EVENING     Last Filled:  1/2/2024    Last appt: 12/18/2023   Next appt: Visit date not found    Last Labs DM:   Lab Results   Component Value Date/Time    LABA1C 6.8 09/09/2023 08:29 AM

## 2024-02-22 RX ORDER — VALSARTAN AND HYDROCHLOROTHIAZIDE 320; 25 MG/1; MG/1
1 TABLET, FILM COATED ORAL DAILY
Qty: 30 TABLET | Refills: 2 | Status: SHIPPED | OUTPATIENT
Start: 2024-02-22

## 2024-02-22 RX ORDER — PIOGLITAZONEHYDROCHLORIDE 30 MG/1
30 TABLET ORAL DAILY
Qty: 30 TABLET | Refills: 2 | Status: SHIPPED | OUTPATIENT
Start: 2024-02-22

## 2024-02-22 NOTE — TELEPHONE ENCOUNTER
Medication:   Requested Prescriptions     Pending Prescriptions Disp Refills    pioglitazone (ACTOS) 30 MG tablet [Pharmacy Med Name: PIOGLITAZONE HCL 30 MG TABLET] 30 tablet 2     Sig: TAKE 1 TABLET BY MOUTH DAILY    valsartan-hydroCHLOROthiazide (DIOVAN-HCT) 320-25 MG per tablet [Pharmacy Med Name: VALSARTAN-HCTZ 320-25 MG TAB] 30 tablet 2     Sig: TAKE 1 TABLET BY MOUTH DAILY     Last filled: 11/27/23  Last appt: 12/18/2023   Next appt: Visit date not found    Last OARRS:        No data to display

## 2024-02-28 RX ORDER — EMPAGLIFLOZIN AND LINAGLIPTIN 10; 5 MG/1; MG/1
1 TABLET, FILM COATED ORAL DAILY
Qty: 90 TABLET | Refills: 1 | Status: SHIPPED | OUTPATIENT
Start: 2024-02-28

## 2024-02-28 NOTE — TELEPHONE ENCOUNTER
Medication:   Requested Prescriptions     Pending Prescriptions Disp Refills    GLYXAMBI 10-5 MG TABS [Pharmacy Med Name: GLYXAMBI 10 MG-5 MG TABLET] 30 tablet      Sig: TAKE 1 TABLET BY MOUTH DAILY     Last Filled:  12/01/2023    Last appt: 12/18/2023   Next appt: Visit date not found    Last OARRS:        No data to display

## 2024-05-13 NOTE — TELEPHONE ENCOUNTER
Medication:   Requested Prescriptions     Pending Prescriptions Disp Refills    rivaroxaban (XARELTO) 20 MG TABS tablet [Pharmacy Med Name: XARELTO 20 MG TABLET] 30 tablet      Sig: TAKE 1 TABLET BY MOUTH DAILY WITH SUPPER     Last Filled:  8.10.23      Last appt: 12/18/2023   Next appt: Visit date not found    Last OARRS:        No data to display

## 2024-05-29 RX ORDER — PIOGLITAZONEHYDROCHLORIDE 30 MG/1
30 TABLET ORAL DAILY
Qty: 30 TABLET | Refills: 2 | Status: CANCELLED | OUTPATIENT
Start: 2024-05-29

## 2024-05-29 RX ORDER — VALSARTAN AND HYDROCHLOROTHIAZIDE 320; 25 MG/1; MG/1
1 TABLET, FILM COATED ORAL DAILY
Qty: 30 TABLET | Refills: 2 | Status: CANCELLED | OUTPATIENT
Start: 2024-05-29

## 2024-05-29 NOTE — TELEPHONE ENCOUNTER
Medication:   Requested Prescriptions     Pending Prescriptions Disp Refills    valsartan-hydroCHLOROthiazide (DIOVAN-HCT) 320-25 MG per tablet 30 tablet 2     Sig: Take 1 tablet by mouth daily    pioglitazone (ACTOS) 30 MG tablet 30 tablet 2     Sig: Take 1 tablet by mouth daily     Last Filled:  2.22.24    Last appt: 12/18/2023   Next appt: Visit date not found  Left message for return call.  Due for OV    Last OARRS:        No data to display

## 2024-05-29 NOTE — TELEPHONE ENCOUNTER
Patient called and requested to refill the following medications      pioglitazone (ACTOS) 30 MG tablet     valsartan-hydroCHLOROthiazide (DIOVAN-HCT) 320-25 MG per tablet     The preferred pharmacy    [facility] [id number] - [place] - [address] - P [phone] - F [phone] [address] [place] [address]  Phone: [phone]  Fax: [phone]     Pl review    thanks           This patient is 45 y/o male with no specific past medical history except remote history of salmonella infection (he was hospitalized and treated with IV antibiotics at that time). He is admitted to hospital with the chief complaint of abdominal pain. CT scan shows possible ileitis either of infectious or inflammatory etiology.     Assessment and plan     1- Ileitis Infectious Vs inflammatory   - Start on Cipro and Flagyl IV   - Continue IV hydration   - Pain control by tramadol PRN   - If no improvement on antibiotics consider GI consult  - Ordered stool studies, calprotectin, ESR     From home This patient is 43 y/o male with no specific past medical history except remote history of salmonella infection (he was hospitalized and treated with IV antibiotics at that time). He is admitted to hospital with the chief complaint of abdominal pain. CT scan shows possible ileitis either of infectious or inflammatory etiology.     Assessment and plan     1- Ileitis Infectious Vs inflammatory   - Start on Cipro and Flagyl IV   - Continue IV hydration normal saline   - Pain control by tramadol PRN- keep patient NPO for now  - If no improvement on antibiotics consider GI consult  - Ordered stool studies, calprotectin, ESR, Salmonella total antibodies     From home This patient is 45 y/o male with no specific past medical history except remote history of salmonella infection (he was hospitalized and treated with IV antibiotics at that time). He is admitted to hospital with the chief complaint of abdominal pain. CT scan shows possible ileitis either of infectious or inflammatory etiology.     Assessment and plan     1- Ileitis Infectious Vs inflammatory   - Start on Cipro and Flagyl IV   - Continue IV hydration normal saline   - Pain control by tramadol PRN- Advance diet as tolerated   - If no improvement on antibiotics consider GI consult  - Ordered stool studies, calprotectin, ESR, Salmonella total antibodies     From home

## 2024-05-30 RX ORDER — AMLODIPINE BESYLATE 5 MG/1
TABLET ORAL
Qty: 30 TABLET | OUTPATIENT
Start: 2024-05-30

## 2024-05-30 NOTE — TELEPHONE ENCOUNTER
Medication:   Requested Prescriptions     Pending Prescriptions Disp Refills    amLODIPine (NORVASC) 5 MG tablet [Pharmacy Med Name: AMLODIPINE BESYLATE 5 MG TAB] 30 tablet      Sig: TAKE 1 TABLET BY MOUTH DAILY     Last Filled:  6/5/23    Last appt: 12/18/2023   Next appt: 5/31/2024    Last OARRS:        No data to display

## 2024-05-31 ENCOUNTER — OFFICE VISIT (OUTPATIENT)
Dept: PRIMARY CARE CLINIC | Age: 69
End: 2024-05-31
Payer: COMMERCIAL

## 2024-05-31 VITALS
BODY MASS INDEX: 26.67 KG/M2 | HEART RATE: 72 BPM | OXYGEN SATURATION: 96 % | WEIGHT: 198.04 LBS | TEMPERATURE: 97.1 F | SYSTOLIC BLOOD PRESSURE: 118 MMHG | DIASTOLIC BLOOD PRESSURE: 72 MMHG

## 2024-05-31 DIAGNOSIS — E78.2 MIXED HYPERLIPIDEMIA: ICD-10-CM

## 2024-05-31 DIAGNOSIS — I10 PRIMARY HYPERTENSION: ICD-10-CM

## 2024-05-31 DIAGNOSIS — E11.9 TYPE 2 DIABETES MELLITUS WITHOUT COMPLICATION, WITHOUT LONG-TERM CURRENT USE OF INSULIN (HCC): Primary | ICD-10-CM

## 2024-05-31 LAB — HBA1C MFR BLD: 7 %

## 2024-05-31 PROCEDURE — 2022F DILAT RTA XM EVC RTNOPTHY: CPT | Performed by: STUDENT IN AN ORGANIZED HEALTH CARE EDUCATION/TRAINING PROGRAM

## 2024-05-31 PROCEDURE — 99214 OFFICE O/P EST MOD 30 MIN: CPT | Performed by: STUDENT IN AN ORGANIZED HEALTH CARE EDUCATION/TRAINING PROGRAM

## 2024-05-31 PROCEDURE — 3078F DIAST BP <80 MM HG: CPT | Performed by: STUDENT IN AN ORGANIZED HEALTH CARE EDUCATION/TRAINING PROGRAM

## 2024-05-31 PROCEDURE — 1036F TOBACCO NON-USER: CPT | Performed by: STUDENT IN AN ORGANIZED HEALTH CARE EDUCATION/TRAINING PROGRAM

## 2024-05-31 PROCEDURE — 83036 HEMOGLOBIN GLYCOSYLATED A1C: CPT | Performed by: STUDENT IN AN ORGANIZED HEALTH CARE EDUCATION/TRAINING PROGRAM

## 2024-05-31 PROCEDURE — G8417 CALC BMI ABV UP PARAM F/U: HCPCS | Performed by: STUDENT IN AN ORGANIZED HEALTH CARE EDUCATION/TRAINING PROGRAM

## 2024-05-31 PROCEDURE — G8427 DOCREV CUR MEDS BY ELIG CLIN: HCPCS | Performed by: STUDENT IN AN ORGANIZED HEALTH CARE EDUCATION/TRAINING PROGRAM

## 2024-05-31 PROCEDURE — 1123F ACP DISCUSS/DSCN MKR DOCD: CPT | Performed by: STUDENT IN AN ORGANIZED HEALTH CARE EDUCATION/TRAINING PROGRAM

## 2024-05-31 PROCEDURE — 3051F HG A1C>EQUAL 7.0%<8.0%: CPT | Performed by: STUDENT IN AN ORGANIZED HEALTH CARE EDUCATION/TRAINING PROGRAM

## 2024-05-31 PROCEDURE — 3017F COLORECTAL CA SCREEN DOC REV: CPT | Performed by: STUDENT IN AN ORGANIZED HEALTH CARE EDUCATION/TRAINING PROGRAM

## 2024-05-31 PROCEDURE — 3074F SYST BP LT 130 MM HG: CPT | Performed by: STUDENT IN AN ORGANIZED HEALTH CARE EDUCATION/TRAINING PROGRAM

## 2024-05-31 RX ORDER — PIOGLITAZONEHYDROCHLORIDE 30 MG/1
30 TABLET ORAL DAILY
Qty: 90 TABLET | Refills: 1 | Status: SHIPPED | OUTPATIENT
Start: 2024-05-31

## 2024-05-31 RX ORDER — VALSARTAN AND HYDROCHLOROTHIAZIDE 320; 25 MG/1; MG/1
1 TABLET, FILM COATED ORAL DAILY
Qty: 90 TABLET | Refills: 1 | Status: SHIPPED | OUTPATIENT
Start: 2024-05-31

## 2024-05-31 RX ORDER — EMPAGLIFLOZIN AND LINAGLIPTIN 10; 5 MG/1; MG/1
1 TABLET, FILM COATED ORAL DAILY
Qty: 90 TABLET | Refills: 1 | Status: SHIPPED | OUTPATIENT
Start: 2024-05-31

## 2024-05-31 RX ORDER — AMLODIPINE BESYLATE 5 MG/1
5 TABLET ORAL DAILY
Qty: 90 TABLET | Refills: 1 | Status: SHIPPED | OUTPATIENT
Start: 2024-05-31

## 2024-05-31 SDOH — ECONOMIC STABILITY: FOOD INSECURITY: WITHIN THE PAST 12 MONTHS, THE FOOD YOU BOUGHT JUST DIDN'T LAST AND YOU DIDN'T HAVE MONEY TO GET MORE.: NEVER TRUE

## 2024-05-31 SDOH — ECONOMIC STABILITY: FOOD INSECURITY: WITHIN THE PAST 12 MONTHS, YOU WORRIED THAT YOUR FOOD WOULD RUN OUT BEFORE YOU GOT MONEY TO BUY MORE.: NEVER TRUE

## 2024-05-31 SDOH — ECONOMIC STABILITY: INCOME INSECURITY: HOW HARD IS IT FOR YOU TO PAY FOR THE VERY BASICS LIKE FOOD, HOUSING, MEDICAL CARE, AND HEATING?: NOT HARD AT ALL

## 2024-05-31 ASSESSMENT — PATIENT HEALTH QUESTIONNAIRE - PHQ9
3. TROUBLE FALLING OR STAYING ASLEEP: NOT AT ALL
4. FEELING TIRED OR HAVING LITTLE ENERGY: NOT AT ALL
9. THOUGHTS THAT YOU WOULD BE BETTER OFF DEAD, OR OF HURTING YOURSELF: NOT AT ALL
2. FEELING DOWN, DEPRESSED OR HOPELESS: NOT AT ALL
7. TROUBLE CONCENTRATING ON THINGS, SUCH AS READING THE NEWSPAPER OR WATCHING TELEVISION: NOT AT ALL
SUM OF ALL RESPONSES TO PHQ9 QUESTIONS 1 & 2: 0
SUM OF ALL RESPONSES TO PHQ QUESTIONS 1-9: 0
SUM OF ALL RESPONSES TO PHQ QUESTIONS 1-9: 0
6. FEELING BAD ABOUT YOURSELF - OR THAT YOU ARE A FAILURE OR HAVE LET YOURSELF OR YOUR FAMILY DOWN: NOT AT ALL
SUM OF ALL RESPONSES TO PHQ QUESTIONS 1-9: 0
10. IF YOU CHECKED OFF ANY PROBLEMS, HOW DIFFICULT HAVE THESE PROBLEMS MADE IT FOR YOU TO DO YOUR WORK, TAKE CARE OF THINGS AT HOME, OR GET ALONG WITH OTHER PEOPLE: NOT DIFFICULT AT ALL
5. POOR APPETITE OR OVEREATING: NOT AT ALL
1. LITTLE INTEREST OR PLEASURE IN DOING THINGS: NOT AT ALL
SUM OF ALL RESPONSES TO PHQ QUESTIONS 1-9: 0
8. MOVING OR SPEAKING SO SLOWLY THAT OTHER PEOPLE COULD HAVE NOTICED. OR THE OPPOSITE, BEING SO FIGETY OR RESTLESS THAT YOU HAVE BEEN MOVING AROUND A LOT MORE THAN USUAL: NOT AT ALL

## 2024-05-31 NOTE — PROGRESS NOTES
Alexis Will (:  1955) is a 68 y.o. male,Established patient, here for evaluation of the following chief complaint(s):  Diabetes and Hypertension      Assessment & Plan   1. Type 2 diabetes mellitus without complication, without long-term current use of insulin (HCC)  -     POCT glycosylated hemoglobin (Hb A1C) is 7.0 today  Borderline goal continue with current regimen and dietary management  -     MICROALBUMIN / CREATININE URINE RATIO; Future  2. Primary hypertension  Chronic well controlled  Continue current regimen  -     Comprehensive Metabolic Panel; Future  -     Urinalysis with Reflex to Culture; Future  3. Mixed hyperlipidemia  Chronic well controlled  Continue current regimen  livalo was too expensive, has had side effects from other statins  He wants to hold off on any treatment until he gets blood work  I think this is fine  -     Lipid, Fasting; Future      Return in about 6 months (around 2024), or if symptoms worsen or fail to improve, for chronic problems.       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         Objective   Physical Exam  Vitals reviewed.   Constitutional:       General: He is not in acute distress.     Appearance: Normal appearance. He is not ill-appearing, toxic-appearing or diaphoretic.   HENT:      Head: Normocephalic and atraumatic.      Right Ear: External ear normal.      Left Ear: External ear normal.      Nose: Nose normal.      Mouth/Throat:      Mouth: Mucous membranes are moist.   Eyes:      Extraocular Movements: Extraocular movements intact.   Cardiovascular:      Rate and Rhythm: Normal rate and regular rhythm.      Heart sounds: Normal heart sounds. No murmur heard.     No friction rub.

## 2024-06-03 DIAGNOSIS — I10 PRIMARY HYPERTENSION: ICD-10-CM

## 2024-06-03 DIAGNOSIS — E11.9 TYPE 2 DIABETES MELLITUS WITHOUT COMPLICATION, WITHOUT LONG-TERM CURRENT USE OF INSULIN (HCC): ICD-10-CM

## 2024-06-03 DIAGNOSIS — E78.2 MIXED HYPERLIPIDEMIA: ICD-10-CM

## 2024-06-03 LAB
ALBUMIN SERPL-MCNC: 4.1 G/DL (ref 3.4–5)
ALBUMIN/GLOB SERPL: 1.6 {RATIO} (ref 1.1–2.2)
ALP SERPL-CCNC: 57 U/L (ref 40–129)
ALT SERPL-CCNC: 13 U/L (ref 10–40)
ANION GAP SERPL CALCULATED.3IONS-SCNC: 10 MMOL/L (ref 3–16)
AST SERPL-CCNC: 13 U/L (ref 15–37)
BILIRUB SERPL-MCNC: 0.4 MG/DL (ref 0–1)
BILIRUB UR QL STRIP.AUTO: NEGATIVE
BUN SERPL-MCNC: 20 MG/DL (ref 7–20)
CALCIUM SERPL-MCNC: 9.4 MG/DL (ref 8.3–10.6)
CHLORIDE SERPL-SCNC: 103 MMOL/L (ref 99–110)
CHOLEST SERPL-MCNC: 220 MG/DL (ref 0–199)
CLARITY UR: CLEAR
CO2 SERPL-SCNC: 25 MMOL/L (ref 21–32)
COLOR UR: YELLOW
CREAT SERPL-MCNC: 1.1 MG/DL (ref 0.8–1.3)
CREAT UR-MCNC: 61.3 MG/DL (ref 39–259)
GFR SERPLBLD CREATININE-BSD FMLA CKD-EPI: 73 ML/MIN/{1.73_M2}
GLUCOSE SERPL-MCNC: 126 MG/DL (ref 70–99)
GLUCOSE UR STRIP.AUTO-MCNC: >=1000 MG/DL
HDLC SERPL-MCNC: 58 MG/DL (ref 40–60)
HGB UR QL STRIP.AUTO: NEGATIVE
KETONES UR STRIP.AUTO-MCNC: NEGATIVE MG/DL
LDL CHOLESTEROL: 136 MG/DL
LEUKOCYTE ESTERASE UR QL STRIP.AUTO: NEGATIVE
MICROALBUMIN UR DL<=1MG/L-MCNC: <1.2 MG/DL
MICROALBUMIN/CREAT UR: NORMAL MG/G (ref 0–30)
NITRITE UR QL STRIP.AUTO: NEGATIVE
PH UR STRIP.AUTO: 6 [PH] (ref 5–8)
POTASSIUM SERPL-SCNC: 4.1 MMOL/L (ref 3.5–5.1)
PROT SERPL-MCNC: 6.6 G/DL (ref 6.4–8.2)
PROT UR STRIP.AUTO-MCNC: NEGATIVE MG/DL
SODIUM SERPL-SCNC: 138 MMOL/L (ref 136–145)
SP GR UR STRIP.AUTO: 1.03 (ref 1–1.03)
TRIGL SERPL-MCNC: 131 MG/DL (ref 0–150)
UA COMPLETE W REFLEX CULTURE PNL UR: ABNORMAL
UA DIPSTICK W REFLEX MICRO PNL UR: ABNORMAL
URN SPEC COLLECT METH UR: ABNORMAL
UROBILINOGEN UR STRIP-ACNC: 0.2 E.U./DL
VLDLC SERPL CALC-MCNC: 26 MG/DL

## 2024-06-05 NOTE — PROGRESS NOTES
Cholesterol is significantly elevated and his risk for having heart attack or stroke in the next 10 years is high, would recommend starting medication to help with cholesterol, it is also recommended to be on this medication for anyone who has diabetes since diabetes can increase risk for plaque buildup.  Medication is called Crestor he can take it daily and we can recheck his fasting lipid panel in 3 months if he is okay with this plan then I will send in medication

## 2024-06-06 NOTE — PROGRESS NOTES
I forgot he gets bad muscle cramping with statins, if he feels he cannot take any statins then Id recommend a medication called Repatha but its difficult to get approved unless a cardiologist (or other specialist) prescribes it so would recommend follow up with cardiology for this medication if he cannot tolerate statins, if he wants to see cards then place referral to Dr Nicolas or Dr Waldron for HLD

## 2024-06-07 NOTE — PROGRESS NOTES
Patient states that he is willing to try crestor before going to cardiology. Patient would like to know if he should establish with a cardiologist because he doesn't have one

## 2024-11-25 RX ORDER — PIOGLITAZONE 30 MG/1
30 TABLET ORAL DAILY
Qty: 30 TABLET | Refills: 0 | Status: SHIPPED | OUTPATIENT
Start: 2024-11-25 | End: 2024-11-27

## 2024-11-25 RX ORDER — AMLODIPINE BESYLATE 5 MG/1
5 TABLET ORAL DAILY
Qty: 30 TABLET | Refills: 0 | Status: SHIPPED | OUTPATIENT
Start: 2024-11-25

## 2024-11-25 RX ORDER — VALSARTAN AND HYDROCHLOROTHIAZIDE 320; 25 MG/1; MG/1
1 TABLET, FILM COATED ORAL DAILY
Qty: 30 TABLET | Refills: 0 | Status: SHIPPED | OUTPATIENT
Start: 2024-11-25

## 2024-11-25 NOTE — TELEPHONE ENCOUNTER
Medication:   Requested Prescriptions     Pending Prescriptions Disp Refills    pioglitazone (ACTOS) 30 MG tablet [Pharmacy Med Name: PIOGLITAZONE HCL 30 MG TABLET] 30 tablet      Sig: TAKE 1 TABLET BY MOUTH DAILY    amLODIPine (NORVASC) 5 MG tablet [Pharmacy Med Name: amLODIPine BESYLATE 5 MG TAB] 30 tablet      Sig: TAKE 1 TABLET BY MOUTH DAILY    valsartan-hydroCHLOROthiazide (DIOVAN-HCT) 320-25 MG per tablet [Pharmacy Med Name: VALSARTAN-HCTZ 320-25 MG TAB] 90 tablet 1     Sig: TAKE 1 TABLET BY MOUTH DAILY     Last Filled:  05/31/2024    Last appt: 5/31/2024   Next appt: Visit date not found    Last OARRS:        No data to display

## 2024-11-25 NOTE — TELEPHONE ENCOUNTER
Script sent for 30 days. Patient is overdue for follow up on diabetes and blood pressure. Please call and schedule OV.

## 2024-11-27 ENCOUNTER — OFFICE VISIT (OUTPATIENT)
Dept: PRIMARY CARE CLINIC | Age: 69
End: 2024-11-27
Payer: COMMERCIAL

## 2024-11-27 VITALS
HEART RATE: 75 BPM | RESPIRATION RATE: 16 BRPM | WEIGHT: 197 LBS | DIASTOLIC BLOOD PRESSURE: 84 MMHG | SYSTOLIC BLOOD PRESSURE: 132 MMHG | OXYGEN SATURATION: 99 % | BODY MASS INDEX: 26.53 KG/M2 | TEMPERATURE: 97.6 F

## 2024-11-27 DIAGNOSIS — E78.2 MIXED HYPERLIPIDEMIA: ICD-10-CM

## 2024-11-27 DIAGNOSIS — E11.9 TYPE 2 DIABETES MELLITUS WITHOUT COMPLICATION, WITHOUT LONG-TERM CURRENT USE OF INSULIN (HCC): Primary | ICD-10-CM

## 2024-11-27 DIAGNOSIS — I71.21 ANEURYSM OF ASCENDING AORTA WITHOUT RUPTURE (HCC): ICD-10-CM

## 2024-11-27 LAB — HBA1C MFR BLD: 6.8 %

## 2024-11-27 PROCEDURE — 1123F ACP DISCUSS/DSCN MKR DOCD: CPT | Performed by: STUDENT IN AN ORGANIZED HEALTH CARE EDUCATION/TRAINING PROGRAM

## 2024-11-27 PROCEDURE — G8427 DOCREV CUR MEDS BY ELIG CLIN: HCPCS | Performed by: STUDENT IN AN ORGANIZED HEALTH CARE EDUCATION/TRAINING PROGRAM

## 2024-11-27 PROCEDURE — 3079F DIAST BP 80-89 MM HG: CPT | Performed by: STUDENT IN AN ORGANIZED HEALTH CARE EDUCATION/TRAINING PROGRAM

## 2024-11-27 PROCEDURE — 3044F HG A1C LEVEL LT 7.0%: CPT | Performed by: STUDENT IN AN ORGANIZED HEALTH CARE EDUCATION/TRAINING PROGRAM

## 2024-11-27 PROCEDURE — 1036F TOBACCO NON-USER: CPT | Performed by: STUDENT IN AN ORGANIZED HEALTH CARE EDUCATION/TRAINING PROGRAM

## 2024-11-27 PROCEDURE — G8484 FLU IMMUNIZE NO ADMIN: HCPCS | Performed by: STUDENT IN AN ORGANIZED HEALTH CARE EDUCATION/TRAINING PROGRAM

## 2024-11-27 PROCEDURE — 3075F SYST BP GE 130 - 139MM HG: CPT | Performed by: STUDENT IN AN ORGANIZED HEALTH CARE EDUCATION/TRAINING PROGRAM

## 2024-11-27 PROCEDURE — 3017F COLORECTAL CA SCREEN DOC REV: CPT | Performed by: STUDENT IN AN ORGANIZED HEALTH CARE EDUCATION/TRAINING PROGRAM

## 2024-11-27 PROCEDURE — G8417 CALC BMI ABV UP PARAM F/U: HCPCS | Performed by: STUDENT IN AN ORGANIZED HEALTH CARE EDUCATION/TRAINING PROGRAM

## 2024-11-27 PROCEDURE — 83036 HEMOGLOBIN GLYCOSYLATED A1C: CPT | Performed by: STUDENT IN AN ORGANIZED HEALTH CARE EDUCATION/TRAINING PROGRAM

## 2024-11-27 PROCEDURE — 2022F DILAT RTA XM EVC RTNOPTHY: CPT | Performed by: STUDENT IN AN ORGANIZED HEALTH CARE EDUCATION/TRAINING PROGRAM

## 2024-11-27 PROCEDURE — 99214 OFFICE O/P EST MOD 30 MIN: CPT | Performed by: STUDENT IN AN ORGANIZED HEALTH CARE EDUCATION/TRAINING PROGRAM

## 2024-11-27 NOTE — ASSESSMENT & PLAN NOTE
Chronic well controlled  Continue current regimen    Orders:    POCT glycosylated hemoglobin (Hb A1C)    Comprehensive Metabolic Panel; Future    TSH with Reflex to FT4 ([place] Only); Future

## 2024-11-27 NOTE — PROGRESS NOTES
Alexis Will (:  1955) is a 68 y.o. male,Established patient, here for evaluation of the following chief complaint(s):  Diabetes and Hyperlipidemia         Assessment & Plan  Type 2 diabetes mellitus without complication, without long-term current use of insulin (HCC)     Chronic well controlled  Continue current regimen    Orders:    POCT glycosylated hemoglobin (Hb A1C)    Comprehensive Metabolic Panel; Future    TSH with Reflex to FT4 ([place] Only); Future    Aneurysm of ascending aorta without rupture ([facility])       Orders:    CT CHEST WO CONTRAST; Future    Mixed hyperlipidemia       Orders:    Lipid, Fasting; Future      Return if symptoms worsen or fail to improve.       Subjective   HPI    Has had muscle cramping with other statins and we tried getting livalo but wasn't covered  Otherwise doing great asymptomatic    Does complain of some dry skin    Review of Systems   All other systems reviewed and are negative.         Objective   Physical Exam  Vitals reviewed.   Constitutional:       General: He is not in acute distress.     Appearance: Normal appearance. He is not ill-appearing, toxic-appearing or diaphoretic.   HENT:      Head: Normocephalic and atraumatic.      Right Ear: External ear normal.      Left Ear: External ear normal.      Nose: Nose normal.      Mouth/Throat:      Mouth: Mucous membranes are moist.   Eyes:      Extraocular Movements: Extraocular movements intact.   Cardiovascular:      Rate and Rhythm: Normal rate and regular rhythm.      Heart sounds: Normal heart sounds. No murmur heard.     No friction rub. No gallop.   Pulmonary:      Effort: Pulmonary effort is normal.      Breath sounds: Normal breath sounds. No wheezing, rhonchi or rales.   Abdominal:      General: Abdomen is flat. Bowel sounds are normal.      Palpations: Abdomen is soft. There is no mass.      Tenderness: There is no abdominal tenderness.   Musculoskeletal:      Cervical back: Normal range of

## 2024-12-03 DIAGNOSIS — E78.2 MIXED HYPERLIPIDEMIA: ICD-10-CM

## 2024-12-03 DIAGNOSIS — E11.9 TYPE 2 DIABETES MELLITUS WITHOUT COMPLICATION, WITHOUT LONG-TERM CURRENT USE OF INSULIN (HCC): ICD-10-CM

## 2024-12-03 LAB
ALBUMIN SERPL-MCNC: 4.4 G/DL (ref 3.4–5)
ALBUMIN/GLOB SERPL: 1.6 {RATIO} (ref 1.1–2.2)
ALP SERPL-CCNC: 57 U/L (ref 40–129)
ALT SERPL-CCNC: 17 U/L (ref 10–40)
ANION GAP SERPL CALCULATED.3IONS-SCNC: 14 MMOL/L (ref 3–16)
AST SERPL-CCNC: 16 U/L (ref 15–37)
BILIRUB SERPL-MCNC: 0.4 MG/DL (ref 0–1)
BUN SERPL-MCNC: 25 MG/DL (ref 7–20)
CALCIUM SERPL-MCNC: 9.8 MG/DL (ref 8.3–10.6)
CHLORIDE SERPL-SCNC: 100 MMOL/L (ref 99–110)
CHOLEST SERPL-MCNC: 283 MG/DL (ref 0–199)
CO2 SERPL-SCNC: 24 MMOL/L (ref 21–32)
CREAT SERPL-MCNC: 1 MG/DL (ref 0.8–1.3)
GFR SERPLBLD CREATININE-BSD FMLA CKD-EPI: 82 ML/MIN/{1.73_M2}
GLUCOSE SERPL-MCNC: 135 MG/DL (ref 70–99)
HDLC SERPL-MCNC: 53 MG/DL (ref 40–60)
LDL CHOLESTEROL: 194 MG/DL
POTASSIUM SERPL-SCNC: 4.1 MMOL/L (ref 3.5–5.1)
PROT SERPL-MCNC: 7.1 G/DL (ref 6.4–8.2)
SODIUM SERPL-SCNC: 138 MMOL/L (ref 136–145)
TRIGL SERPL-MCNC: 178 MG/DL (ref 0–150)
TSH SERPL DL<=0.005 MIU/L-ACNC: 1.91 UIU/ML (ref 0.27–4.2)
VLDLC SERPL CALC-MCNC: 36 MG/DL

## 2024-12-12 RX ORDER — EZETIMIBE 10 MG/1
10 TABLET ORAL DAILY
Qty: 90 TABLET | Refills: 1 | Status: SHIPPED | OUTPATIENT
Start: 2024-12-12

## 2024-12-16 ENCOUNTER — HOSPITAL ENCOUNTER (OUTPATIENT)
Dept: CT IMAGING | Age: 69
Discharge: HOME | End: 2024-12-16
Payer: COMMERCIAL

## 2024-12-16 DIAGNOSIS — I71.21 ANEURYSM OF ASCENDING AORTA WITHOUT RUPTURE (HCC): ICD-10-CM

## 2024-12-16 PROCEDURE — 71250 CT THORAX DX C-: CPT

## 2024-12-17 DIAGNOSIS — R91.1 LUNG NODULE SEEN ON IMAGING STUDY: Primary | ICD-10-CM

## 2024-12-19 ENCOUNTER — TELEPHONE (OUTPATIENT)
Age: 69
End: 2024-12-19

## 2024-12-19 NOTE — TELEPHONE ENCOUNTER
Patient was referred by Dr. Castillo for a Lung Nodule.  Soonest appt was with Dr. Austin on 2/18/2025    Is this date ok or do you think he should be seen sooner ?

## 2024-12-23 RX ORDER — VALSARTAN AND HYDROCHLOROTHIAZIDE 320; 25 MG/1; MG/1
1 TABLET, FILM COATED ORAL DAILY
Qty: 30 TABLET | Refills: 0 | Status: SHIPPED | OUTPATIENT
Start: 2024-12-23

## 2024-12-23 RX ORDER — AMLODIPINE BESYLATE 5 MG/1
5 TABLET ORAL DAILY
Qty: 30 TABLET | Refills: 0 | Status: SHIPPED | OUTPATIENT
Start: 2024-12-23

## 2024-12-23 NOTE — TELEPHONE ENCOUNTER
Medication:   Requested Prescriptions     Pending Prescriptions Disp Refills    amLODIPine (NORVASC) 5 MG tablet [Pharmacy Med Name: amLODIPine BESYLATE 5 MG TAB] 30 tablet 0     Sig: TAKE 1 TABLET BY MOUTH DAILY    valsartan-hydroCHLOROthiazide (DIOVAN-HCT) 320-25 MG per tablet [Pharmacy Med Name: VALSARTAN-HCTZ 320-25 MG TAB] 30 tablet 0     Sig: TAKE 1 TABLET BY MOUTH DAILY     Last Filled:  11/25/2024    Last appt: 11/27/2024   Next appt: Visit date not found    Last OARRS:        No data to display              
No

## 2024-12-30 RX ORDER — PIOGLITAZONE 30 MG/1
30 TABLET ORAL DAILY
Qty: 90 TABLET | Refills: 1 | Status: SHIPPED | OUTPATIENT
Start: 2024-12-30

## 2024-12-30 NOTE — TELEPHONE ENCOUNTER
Pt needs refill   pioglitazone (ACTOS) 30 MG tablet     Pharmacy    [facility] [id number] - [place] - [address] - P [phone] - F [phone] [address] [place] [address]  Phone: [phone]  Fax: [phone]

## 2025-01-08 NOTE — TELEPHONE ENCOUNTER
Medication:   Requested Prescriptions     Pending Prescriptions Disp Refills    metFORMIN (GLUCOPHAGE) 500 MG tablet [Pharmacy Med Name: METFORMIN  MG TABLET] 90 tablet      Sig: TAKE 2 TABLETS BY MOUTH EVERY MORNING AND TAKE ONE TABLET BY MOUTH EVERY EVENING     Last Filled:  5.31.24    Last appt: 11/27/2024   Next appt: Visit date not found    Last Labs DM:   Lab Results   Component Value Date/Time    LABA1C 6.8 11/27/2024 02:58 PM

## 2025-01-21 RX ORDER — AMLODIPINE BESYLATE 5 MG/1
5 TABLET ORAL DAILY
Qty: 30 TABLET | Refills: 0 | Status: SHIPPED | OUTPATIENT
Start: 2025-01-21

## 2025-01-21 RX ORDER — VALSARTAN AND HYDROCHLOROTHIAZIDE 320; 25 MG/1; MG/1
1 TABLET, FILM COATED ORAL DAILY
Qty: 30 TABLET | Refills: 0 | Status: SHIPPED | OUTPATIENT
Start: 2025-01-21

## 2025-01-21 NOTE — TELEPHONE ENCOUNTER
Medication:   Requested Prescriptions     Pending Prescriptions Disp Refills    valsartan-hydroCHLOROthiazide (DIOVAN-HCT) 320-25 MG per tablet [Pharmacy Med Name: VALSARTAN-HCTZ 320-25 MG TAB] 30 tablet 0     Sig: TAKE 1 TABLET BY MOUTH DAILY    amLODIPine (NORVASC) 5 MG tablet [Pharmacy Med Name: amLODIPine BESYLATE 5 MG TAB] 30 tablet 0     Sig: TAKE 1 TABLET BY MOUTH DAILY     Last Filled:  12.23.24    Last appt: 11/27/2024   Next appt: Visit date not found    Last OARRS:        No data to display

## 2025-02-18 ENCOUNTER — OFFICE VISIT (OUTPATIENT)
Age: 70
End: 2025-02-18
Payer: MEDICARE

## 2025-02-18 VITALS
SYSTOLIC BLOOD PRESSURE: 102 MMHG | DIASTOLIC BLOOD PRESSURE: 58 MMHG | WEIGHT: 197.31 LBS | OXYGEN SATURATION: 95 % | BODY MASS INDEX: 26.73 KG/M2 | HEIGHT: 72 IN | HEART RATE: 86 BPM

## 2025-02-18 DIAGNOSIS — R91.1 LUNG NODULE SEEN ON IMAGING STUDY: Primary | ICD-10-CM

## 2025-02-18 PROCEDURE — 1159F MED LIST DOCD IN RCRD: CPT | Performed by: INTERNAL MEDICINE

## 2025-02-18 PROCEDURE — 3078F DIAST BP <80 MM HG: CPT | Performed by: INTERNAL MEDICINE

## 2025-02-18 PROCEDURE — 99203 OFFICE O/P NEW LOW 30 MIN: CPT | Performed by: INTERNAL MEDICINE

## 2025-02-18 PROCEDURE — 3074F SYST BP LT 130 MM HG: CPT | Performed by: INTERNAL MEDICINE

## 2025-02-18 PROCEDURE — 1123F ACP DISCUSS/DSCN MKR DOCD: CPT | Performed by: INTERNAL MEDICINE

## 2025-02-18 NOTE — PROGRESS NOTES
Alexis Will (:  1955) is a 69 y.o. male,Established patient, here for evaluation of the following chief complaint(s):  New Patient (LUNG NODULE )         Assessment & Plan  Lung nodule seen on imaging study            Left lower lobe pulmonary nodule with central lucency is found incidentally on CT scan to assess aortic aneurysm.  There is no significant change when compared to prior study of 2023, and minimal change compared to 2019.  In this patient with no identifiable risk for developing pulmonary neoplasm, this lesion is considered benign.  It is most likely secondary to fungus, suspect blastomycosis.  He will have periodic CT chest exams to monitor aortic aneurysm.  We will incidentally be able to monitor any changes in this left lower lobe nodule.    Return if symptoms worsen or fail to improve.       Subjective   HPI   Alexis Will is referred by Dr. Bozena Castillo for evaluation of abnormal chest CT scan.  He had a CT chest to follow-up thoracic aortic aneurysm.  A left lower lobe nodule was seen incidentally.  Mr. Will has no history of pulmonary disease.  He has no respiratory symptoms, including dyspnea cough tightness or wheezing.  He is a lifelong non-smoker.  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  He has had particularly significant exposure to earth that has been dug, and blown by the wind.  I have reviewed CT scans from 2013, 19, 23, and 24.  On the initial scan, there was a small groundglass nodule in the left lower lobe which 6 years later is seen as a small cavitary nodule measuring approximately 8.5 mm in greatest dimension.  On scans from  and , there has been a slight growth, with maximum dimension about 10 mm.  The character of the lesion has not changed.  There are no other lesions identified.           Objective   Physical Exam       On this date 2025 I have spent 30

## 2025-02-20 RX ORDER — EMPAGLIFLOZIN AND LINAGLIPTIN 10; 5 MG/1; MG/1
1 TABLET, FILM COATED ORAL DAILY
Qty: 90 TABLET | Refills: 0 | Status: SHIPPED | OUTPATIENT
Start: 2025-02-20

## 2025-02-20 RX ORDER — VALSARTAN AND HYDROCHLOROTHIAZIDE 320; 25 MG/1; MG/1
1 TABLET, FILM COATED ORAL DAILY
Qty: 90 TABLET | Refills: 0 | Status: SHIPPED | OUTPATIENT
Start: 2025-02-20

## 2025-02-20 RX ORDER — AMLODIPINE BESYLATE 5 MG/1
5 TABLET ORAL DAILY
Qty: 90 TABLET | Refills: 0 | Status: SHIPPED | OUTPATIENT
Start: 2025-02-20

## 2025-02-20 NOTE — TELEPHONE ENCOUNTER
Medication:   Requested Prescriptions     Pending Prescriptions Disp Refills    amLODIPine (NORVASC) 5 MG tablet [Pharmacy Med Name: amLODIPine BESYLATE 5 MG TAB] 30 tablet 0     Sig: TAKE 1 TABLET BY MOUTH DAILY    GLYXAMBI 10-5 MG TABS [Pharmacy Med Name: GLYXAMBI 10 MG-5 MG TABLET] 30 tablet      Sig: TAKE 1 TABLET BY MOUTH DAILY    valsartan-hydroCHLOROthiazide (DIOVAN-HCT) 320-25 MG per tablet [Pharmacy Med Name: VALSARTAN-HCTZ 320-25 MG TAB] 30 tablet 0     Sig: TAKE 1 TABLET BY MOUTH DAILY     Last Filled:  1.21.25  5.31.24    Last appt: 11/27/2024   Next appt: Visit date not found    Last OARRS:        No data to display

## 2025-05-09 RX ORDER — RIVAROXABAN 20 MG/1
20 TABLET, FILM COATED ORAL
Qty: 30 TABLET | Refills: 0 | Status: SHIPPED | OUTPATIENT
Start: 2025-05-09

## 2025-05-09 NOTE — TELEPHONE ENCOUNTER
Medication:   Requested Prescriptions     Pending Prescriptions Disp Refills    XARELTO 20 MG TABS tablet [Pharmacy Med Name: XARELTO 20 MG TABLET] 30 tablet      Sig: TAKE 1 TABLET BY MOUTH DAILY WITH SUPPER     Last filled: 5/31/24  Last appt: 11/27/2024   Next appt: Visit date not found    Last OARRS:        No data to display

## 2025-05-19 RX ORDER — AMLODIPINE BESYLATE 5 MG/1
5 TABLET ORAL DAILY
Qty: 90 TABLET | Refills: 0 | Status: SHIPPED | OUTPATIENT
Start: 2025-05-19

## 2025-05-19 RX ORDER — VALSARTAN AND HYDROCHLOROTHIAZIDE 320; 25 MG/1; MG/1
1 TABLET, FILM COATED ORAL DAILY
Qty: 90 TABLET | Refills: 0 | Status: SHIPPED | OUTPATIENT
Start: 2025-05-19

## 2025-05-19 RX ORDER — EMPAGLIFLOZIN AND LINAGLIPTIN 10; 5 MG/1; MG/1
1 TABLET, FILM COATED ORAL DAILY
Qty: 90 TABLET | Refills: 0 | Status: SHIPPED | OUTPATIENT
Start: 2025-05-19

## 2025-05-19 NOTE — TELEPHONE ENCOUNTER
Medication:   Requested Prescriptions     Pending Prescriptions Disp Refills    metFORMIN (GLUCOPHAGE) 500 MG tablet [Pharmacy Med Name: METFORMIN  MG TABLET] 270 tablet 0     Sig: TAKE 2 TABLETS BY MOUTH EVERY MORNING AND TAKE 1 TABLET BY MOUTH EVERY EVENING    valsartan-hydroCHLOROthiazide (DIOVAN-HCT) 320-25 MG per tablet [Pharmacy Med Name: VALSARTAN-HCTZ 320-25 MG TAB] 90 tablet 0     Sig: TAKE 1 TABLET BY MOUTH DAILY    GLYXAMBI 10-5 MG TABS [Pharmacy Med Name: GLYXAMBI 10 MG-5 MG TABLET] 90 tablet 0     Sig: TAKE 1 TABLET BY MOUTH DAILY    amLODIPine (NORVASC) 5 MG tablet [Pharmacy Med Name: amLODIPine BESYLATE 5 MG TAB] 90 tablet 0     Sig: TAKE 1 TABLET BY MOUTH DAILY     Last Filled:  2.20.25    Last appt: 11/27/2024   Next appt: 5.29.25    Last Labs DM:   Lab Results   Component Value Date/Time    LABA1C 6.8 11/27/2024 02:58 PM

## 2025-05-29 ENCOUNTER — OFFICE VISIT (OUTPATIENT)
Dept: PRIMARY CARE CLINIC | Age: 70
End: 2025-05-29
Payer: MEDICARE

## 2025-05-29 ENCOUNTER — TELEPHONE (OUTPATIENT)
Dept: PHARMACY | Facility: CLINIC | Age: 70
End: 2025-05-29

## 2025-05-29 VITALS
WEIGHT: 193 LBS | DIASTOLIC BLOOD PRESSURE: 62 MMHG | SYSTOLIC BLOOD PRESSURE: 124 MMHG | TEMPERATURE: 97 F | OXYGEN SATURATION: 96 % | HEART RATE: 75 BPM | BODY MASS INDEX: 26.18 KG/M2

## 2025-05-29 DIAGNOSIS — R06.2 WHEEZING: Primary | ICD-10-CM

## 2025-05-29 DIAGNOSIS — I71.20 THORACIC AORTIC ANEURYSM WITHOUT RUPTURE, UNSPECIFIED PART: ICD-10-CM

## 2025-05-29 DIAGNOSIS — R35.1 NOCTURIA: ICD-10-CM

## 2025-05-29 DIAGNOSIS — E11.9 TYPE 2 DIABETES MELLITUS WITHOUT COMPLICATION, WITHOUT LONG-TERM CURRENT USE OF INSULIN (HCC): ICD-10-CM

## 2025-05-29 DIAGNOSIS — T46.6X5A STATIN MYOPATHY: ICD-10-CM

## 2025-05-29 DIAGNOSIS — Z12.5 SCREENING PSA (PROSTATE SPECIFIC ANTIGEN): ICD-10-CM

## 2025-05-29 DIAGNOSIS — Z86.718 HISTORY OF DEEP VEIN THROMBOSIS: ICD-10-CM

## 2025-05-29 DIAGNOSIS — I10 PRIMARY HYPERTENSION: ICD-10-CM

## 2025-05-29 DIAGNOSIS — E78.2 MIXED HYPERLIPIDEMIA: ICD-10-CM

## 2025-05-29 DIAGNOSIS — H93.13 TINNITUS, BILATERAL: ICD-10-CM

## 2025-05-29 DIAGNOSIS — G72.0 STATIN MYOPATHY: ICD-10-CM

## 2025-05-29 PROCEDURE — 1159F MED LIST DOCD IN RCRD: CPT | Performed by: STUDENT IN AN ORGANIZED HEALTH CARE EDUCATION/TRAINING PROGRAM

## 2025-05-29 PROCEDURE — 3074F SYST BP LT 130 MM HG: CPT | Performed by: STUDENT IN AN ORGANIZED HEALTH CARE EDUCATION/TRAINING PROGRAM

## 2025-05-29 PROCEDURE — 99214 OFFICE O/P EST MOD 30 MIN: CPT | Performed by: STUDENT IN AN ORGANIZED HEALTH CARE EDUCATION/TRAINING PROGRAM

## 2025-05-29 PROCEDURE — 1123F ACP DISCUSS/DSCN MKR DOCD: CPT | Performed by: STUDENT IN AN ORGANIZED HEALTH CARE EDUCATION/TRAINING PROGRAM

## 2025-05-29 PROCEDURE — 3078F DIAST BP <80 MM HG: CPT | Performed by: STUDENT IN AN ORGANIZED HEALTH CARE EDUCATION/TRAINING PROGRAM

## 2025-05-29 RX ORDER — VALSARTAN AND HYDROCHLOROTHIAZIDE 320; 25 MG/1; MG/1
1 TABLET, FILM COATED ORAL DAILY
Qty: 90 TABLET | Refills: 0 | Status: SHIPPED | OUTPATIENT
Start: 2025-05-29

## 2025-05-29 RX ORDER — PIOGLITAZONE 30 MG/1
30 TABLET ORAL DAILY
Qty: 90 TABLET | Refills: 1 | Status: SHIPPED | OUTPATIENT
Start: 2025-05-29

## 2025-05-29 RX ORDER — AMLODIPINE BESYLATE 5 MG/1
5 TABLET ORAL DAILY
Qty: 90 TABLET | Refills: 1 | Status: SHIPPED | OUTPATIENT
Start: 2025-05-29

## 2025-05-29 RX ORDER — EZETIMIBE 10 MG/1
10 TABLET ORAL DAILY
Qty: 90 TABLET | Refills: 1 | Status: SHIPPED | OUTPATIENT
Start: 2025-05-29

## 2025-05-29 RX ORDER — EMPAGLIFLOZIN AND LINAGLIPTIN 10; 5 MG/1; MG/1
1 TABLET, FILM COATED ORAL DAILY
Qty: 90 TABLET | Refills: 1 | Status: SHIPPED | OUTPATIENT
Start: 2025-05-29

## 2025-05-29 SDOH — ECONOMIC STABILITY: FOOD INSECURITY: WITHIN THE PAST 12 MONTHS, THE FOOD YOU BOUGHT JUST DIDN'T LAST AND YOU DIDN'T HAVE MONEY TO GET MORE.: NEVER TRUE

## 2025-05-29 SDOH — ECONOMIC STABILITY: FOOD INSECURITY: WITHIN THE PAST 12 MONTHS, YOU WORRIED THAT YOUR FOOD WOULD RUN OUT BEFORE YOU GOT MONEY TO BUY MORE.: NEVER TRUE

## 2025-05-29 ASSESSMENT — PATIENT HEALTH QUESTIONNAIRE - PHQ9
2. FEELING DOWN, DEPRESSED OR HOPELESS: NOT AT ALL
SUM OF ALL RESPONSES TO PHQ QUESTIONS 1-9: 0
1. LITTLE INTEREST OR PLEASURE IN DOING THINGS: NOT AT ALL
SUM OF ALL RESPONSES TO PHQ QUESTIONS 1-9: 0

## 2025-05-29 NOTE — ASSESSMENT & PLAN NOTE
Chronic unclear control    Orders:    ezetimibe (ZETIA) 10 MG tablet; Take 1 tablet by mouth daily    Lipid, Fasting; Future    Comprehensive Metabolic Panel; Future    Hemoglobin A1C; Future    Albumin/Creatinine Ratio, Urine; Future    TSH reflex to FT4; Future    Vitamin B12; Future

## 2025-05-29 NOTE — PROGRESS NOTES
Alexis Will (:  1955) is a 69 y.o. male,Established patient, here for evaluation of the following chief complaint(s):       Assessment & Plan  Wheezing     Denies any sick symptoms and no shortness of breath  Orders:    XR CHEST (2 VW); Future    Mixed hyperlipidemia     Chronic unclear control    Orders:    ezetimibe (ZETIA) 10 MG tablet; Take 1 tablet by mouth daily    Lipid, Fasting; Future    Comprehensive Metabolic Panel; Future    Hemoglobin A1C; Future    Albumin/Creatinine Ratio, Urine; Future    TSH reflex to FT4; Future    Vitamin B12; Future    Type 2 diabetes mellitus without complication, without long-term current use of insulin (HCC)     Chronic unclear control  Orders:    pioglitazone (ACTOS) 30 MG tablet; Take 1 tablet by mouth daily    metFORMIN (GLUCOPHAGE) 500 MG tablet; TAKE 2 TABLETS BY MOUTH EVERY MORNING AND TAKE 1 TABLET BY MOUTH EVERY EVENING    Empagliflozin-linaGLIPtin (GLYXAMBI) 10-5 MG TABS; Take 1 tablet by mouth daily    Lipid, Fasting; Future    Comprehensive Metabolic Panel; Future    Hemoglobin A1C; Future    Albumin/Creatinine Ratio, Urine; Future    TSH reflex to FT4; Future    Vitamin B12; Future    Primary hypertension     Chronic well-controlled continue current regimen  Orders:    valsartan-hydroCHLOROthiazide (DIOVAN-HCT) 320-25 MG per tablet; Take 1 tablet by mouth daily    amLODIPine (NORVASC) 5 MG tablet; Take 1 tablet by mouth daily    Statin myopathy     Takes ezetimibe now       Thoracic aortic aneurysm without rupture, unspecified part     Due for repeat check in November       Screening PSA (prostate specific antigen)       Orders:    PSA, Prostatic Specific Antigen ([facility]); Future    Nocturia       Orders:    PSA, Prostatic Specific Antigen ([facility]); Future    Tinnitus, bilateral       Orders:    Cyn Perez, [place],   Audiology, [facility]    History of deep vein thrombosis       Orders:    rivaroxaban (XARELTO) 20

## 2025-05-29 NOTE — ASSESSMENT & PLAN NOTE
Chronic unclear control  Orders:    pioglitazone (ACTOS) 30 MG tablet; Take 1 tablet by mouth daily    metFORMIN (GLUCOPHAGE) 500 MG tablet; TAKE 2 TABLETS BY MOUTH EVERY MORNING AND TAKE 1 TABLET BY MOUTH EVERY EVENING    Empagliflozin-linaGLIPtin (GLYXAMBI) 10-5 MG TABS; Take 1 tablet by mouth daily    Lipid, Fasting; Future    Comprehensive Metabolic Panel; Future    Hemoglobin A1C; Future    Albumin/Creatinine Ratio, Urine; Future    TSH reflex to FT4; Future    Vitamin B12; Future

## 2025-05-29 NOTE — ASSESSMENT & PLAN NOTE
Chronic well-controlled continue current regimen  Orders:    valsartan-hydroCHLOROthiazide (DIOVAN-HCT) 320-25 MG per tablet; Take 1 tablet by mouth daily    amLODIPine (NORVASC) 5 MG tablet; Take 1 tablet by mouth daily

## 2025-05-29 NOTE — TELEPHONE ENCOUNTER
Bozena Castillo MD - Statin Therapy Care Gap    Patient has a visit with you on 5/29/25.  Chart reviewed due to insurer-identified care gap.  Alexis Will is receiving treatment for diabetes and is not currently prescribed statin therapy.      Per chart review:  Per EPIC medication list history, patient was previously taking simvastatin 20 mg (2012), pitavastatin 2-4 mg ([id number]), and rosuvastatin 5 mg (6/10/24 to 11/27/24).  Statin intolerance documented on EPIC allergy list:  myalgias and cramping (noted 11/24/12)  Statin intolerance documented on EPIC problem list (noted 2/27/17)      If statin therapy is not appropriate for patient, please consider adding the following CMS allowable diagnosis as a visit diagnosis within your 5/29/25 encounter for statin exclusion in 2025:  statin myopathy (ICD-10 G72.0, T46.6X5A)  Please note that CMS does not accept myalgia due to statin (M79.10, T46.6X5A), statin intolerance (Z78.9), or adverse reaction to statin medication (ICD-10 T46.6X5A) for statin exclusion.  Please note that CMS does not accept documentation of the diagnosis in the problem/allergy list or past medical history - diagnosis must be added as a visit diagnosis (visit claim is used to track the exclusion).      Patient may need a new prescription for further refills of pioglitazone.      Thank you,  Enid Joiner, PharmD, [facility] Pharmacist  [facility]  Department, toll free: [phone], option 1   ===============================================================================    [facility]: STATIN THERAPY REVIEW  Identified statin use in persons with diabetes care gap per [place]. Records dated: 5/19/25.    Last Office Visit: 11/27/24 with Bozena Castillo MD    Patient also appears to be prescribed the following medications in an adherence measure: pioglitazone, empagliflozin/linagliptin, metformin, valsartan/hctz    Allergies   Allergen

## 2025-05-30 ENCOUNTER — HOSPITAL ENCOUNTER (OUTPATIENT)
Dept: GENERAL RADIOLOGY | Age: 70
Discharge: HOME | End: 2025-05-30
Payer: MEDICARE

## 2025-05-30 ENCOUNTER — RESULTS FOLLOW-UP (OUTPATIENT)
Dept: PRIMARY CARE CLINIC | Age: 70
End: 2025-05-30

## 2025-05-30 DIAGNOSIS — R06.2 WHEEZING: ICD-10-CM

## 2025-05-30 PROCEDURE — 71046 X-RAY EXAM CHEST 2 VIEWS: CPT

## 2025-05-31 DIAGNOSIS — R35.1 NOCTURIA: ICD-10-CM

## 2025-05-31 DIAGNOSIS — E11.9 TYPE 2 DIABETES MELLITUS WITHOUT COMPLICATION, WITHOUT LONG-TERM CURRENT USE OF INSULIN (HCC): ICD-10-CM

## 2025-05-31 DIAGNOSIS — E78.2 MIXED HYPERLIPIDEMIA: ICD-10-CM

## 2025-05-31 DIAGNOSIS — Z12.5 SCREENING PSA (PROSTATE SPECIFIC ANTIGEN): ICD-10-CM

## 2025-05-31 LAB
ALBUMIN SERPL-MCNC: 4.2 G/DL (ref 3.4–5)
ALBUMIN/GLOB SERPL: 1.7 {RATIO} (ref 1.1–2.2)
ALP SERPL-CCNC: 57 U/L (ref 40–129)
ALT SERPL-CCNC: 18 U/L (ref 10–40)
ANION GAP SERPL CALCULATED.3IONS-SCNC: 13 MMOL/L (ref 3–16)
AST SERPL-CCNC: 17 U/L (ref 15–37)
BILIRUB SERPL-MCNC: 0.4 MG/DL (ref 0–1)
BUN SERPL-MCNC: 19 MG/DL (ref 7–20)
CALCIUM SERPL-MCNC: 9.5 MG/DL (ref 8.3–10.6)
CHLORIDE SERPL-SCNC: 101 MMOL/L (ref 99–110)
CHOLEST SERPL-MCNC: 203 MG/DL (ref 0–199)
CO2 SERPL-SCNC: 23 MMOL/L (ref 21–32)
CREAT SERPL-MCNC: 1.1 MG/DL (ref 0.8–1.3)
CREAT UR-MCNC: 65.8 MG/DL (ref 39–259)
GFR SERPLBLD CREATININE-BSD FMLA CKD-EPI: 73 ML/MIN/{1.73_M2}
GLUCOSE SERPL-MCNC: 125 MG/DL (ref 70–99)
HDLC SERPL-MCNC: 57 MG/DL (ref 40–60)
LDL CHOLESTEROL: 116 MG/DL
MICROALBUMIN UR DL<=1MG/L-MCNC: <1.2 MG/DL
MICROALBUMIN/CREAT UR: NORMAL MG/G (ref 0–30)
POTASSIUM SERPL-SCNC: 4.2 MMOL/L (ref 3.5–5.1)
PROT SERPL-MCNC: 6.7 G/DL (ref 6.4–8.2)
PSA SERPL DL<=0.01 NG/ML-MCNC: 1.8 NG/ML (ref 0–4)
SODIUM SERPL-SCNC: 137 MMOL/L (ref 136–145)
TRIGL SERPL-MCNC: 150 MG/DL (ref 0–150)
TSH SERPL DL<=0.005 MIU/L-ACNC: 1.21 UIU/ML (ref 0.27–4.2)
VIT B12 SERPL-MCNC: 578 PG/ML (ref 211–911)
VLDLC SERPL CALC-MCNC: 30 MG/DL

## 2025-06-01 LAB
EST. AVERAGE GLUCOSE BLD GHB EST-MCNC: 148.5 MG/DL
HBA1C MFR BLD: 6.8 %

## 2025-07-24 ENCOUNTER — PROCEDURE VISIT (OUTPATIENT)
Age: 70
End: 2025-07-24
Payer: MEDICARE

## 2025-07-24 ENCOUNTER — OFFICE VISIT (OUTPATIENT)
Age: 70
End: 2025-07-24
Payer: MEDICARE

## 2025-07-24 VITALS
OXYGEN SATURATION: 96 % | TEMPERATURE: 96.8 F | BODY MASS INDEX: 26.01 KG/M2 | WEIGHT: 192 LBS | HEIGHT: 72 IN | DIASTOLIC BLOOD PRESSURE: 71 MMHG | SYSTOLIC BLOOD PRESSURE: 125 MMHG | HEART RATE: 82 BPM

## 2025-07-24 DIAGNOSIS — H90.3 SENSORINEURAL HEARING LOSS (SNHL) OF BOTH EARS: Primary | ICD-10-CM

## 2025-07-24 DIAGNOSIS — H93.13 TINNITUS OF BOTH EARS: ICD-10-CM

## 2025-07-24 DIAGNOSIS — H91.8X3 ASYMMETRICAL HEARING LOSS: ICD-10-CM

## 2025-07-24 DIAGNOSIS — H90.A22 SENSORINEURAL HEARING LOSS (SNHL) OF LEFT EAR WITH RESTRICTED HEARING OF RIGHT EAR: ICD-10-CM

## 2025-07-24 DIAGNOSIS — H90.3 ASYMMETRIC SNHL (SENSORINEURAL HEARING LOSS): ICD-10-CM

## 2025-07-24 DIAGNOSIS — H93.13 TINNITUS OF BOTH EARS: Primary | ICD-10-CM

## 2025-07-24 PROCEDURE — 1159F MED LIST DOCD IN RCRD: CPT | Performed by: OTOLARYNGOLOGY

## 2025-07-24 PROCEDURE — 1123F ACP DISCUSS/DSCN MKR DOCD: CPT | Performed by: OTOLARYNGOLOGY

## 2025-07-24 PROCEDURE — 92557 COMPREHENSIVE HEARING TEST: CPT

## 2025-07-24 PROCEDURE — 3074F SYST BP LT 130 MM HG: CPT | Performed by: OTOLARYNGOLOGY

## 2025-07-24 PROCEDURE — 92567 TYMPANOMETRY: CPT

## 2025-07-24 PROCEDURE — 99204 OFFICE O/P NEW MOD 45 MIN: CPT | Performed by: OTOLARYNGOLOGY

## 2025-07-24 PROCEDURE — 92504 EAR MICROSCOPY EXAMINATION: CPT | Performed by: OTOLARYNGOLOGY

## 2025-07-24 PROCEDURE — 3078F DIAST BP <80 MM HG: CPT | Performed by: OTOLARYNGOLOGY

## 2025-07-24 ASSESSMENT — ENCOUNTER SYMPTOMS
COUGH: 0
SINUS PRESSURE: 0
BLOOD IN STOOL: 0
STRIDOR: 0
VOICE CHANGE: 0
CHOKING: 0
TROUBLE SWALLOWING: 0
EYE ITCHING: 0
DIARRHEA: 0
FACIAL SWELLING: 0
PHOTOPHOBIA: 0
SINUS PAIN: 0
WHEEZING: 0
NAUSEA: 0
COLOR CHANGE: 0
SHORTNESS OF BREATH: 0
BACK PAIN: 0
SORE THROAT: 0
EYE DISCHARGE: 0
CONSTIPATION: 0
VOMITING: 0
RHINORRHEA: 0

## 2025-07-24 NOTE — PROGRESS NOTES
Alexis Will   1955, 69 y.o. male   [id number]       Referring Provider: Bozena Castillo MD   Referral Type: In an order in Epic    Reason for Visit: Evaluation of suspected change in hearing, tinnitus, or balance.    ADULT AUDIOLOGIC EVALUATION      Alexis Will is a 69 y.o. male seen today, 7/24/2025 , for an initial audiologic evaluation.  Patient was seen by Fabio Joseph DO following today's evaluation.    AUDIOLOGIC AND OTHER PERTINENT MEDICAL HISTORY:      Alexis Will reports tinnitus in both ears that was recently very loud and bothersome, that has since improved. He noted that while the tinnitus was bad, he was not able to hear well. He stated that now, it is not bothersome. Patient reported that about 10 years ago, he had what sounded like a sudden sensorineural hearing loss in the left ear. He endorsed a history of occupational noise exposure.     He denied otalgia, aural fullness, otorrhea, dizziness, imbalance, history of falls, history of head trauma, history of ear surgery, and family history of hearing loss    Date: 7/24/2025     IMPRESSIONS:      Today's results revealed a Mild sloping to Moderate Sensorineural hearing loss in the right ear and a Moderate sloping to Moderately-Severe Sensorineural hearing loss in the left ear. Good speech understanding when in quiet in the right ear; poor in the left ear. Based on the Speech Discrimination Scores Table developed by Jean Marie and Raffin (1978), there IS a statistical difference between the patient's word recognition scores. Tympanometry indicates normal middle ear function. Discussed test results and implications with patient. Discussed possible benefits of amplification.  Discussed use of tinnitus management strategies. Provided basic tinnitus education, including the neurophysiological model. Discussed noise exposure and the importance of appropriate hearing protection when in hazardous noise environments.    Consider a cochlear

## 2025-07-24 NOTE — PATIENT INSTRUCTIONS
hearing. Instead, it can give a person with hearing loss a useful representation of sounds in the environment and help him or her to understand speech.    How does a cochlear implant work?  A cochlear implant is very different from a hearing aid. Hearing aids amplify sounds so they may be detected by damaged ears. Cochlear implants bypass damaged portions of the ear and directly stimulate the auditory nerve. Signals generated by the implant are sent by way of the auditory nerve to the brain, which recognizes the signals as sound. Hearing through a cochlear implant is different from normal hearing and takes time to learn or relearn. However, it allows many people to recognize warning signals, understand other sounds in the environment, and understand speech in person or over the telephone.    How does someone receive a cochlear implant?  Use of a cochlear implant requires both a surgical procedure and significant therapy to learn or relearn the sense of hearing. Not everyone performs at the same level with this device. The decision to receive an implant should involve discussions with medical specialists, including an experienced cochlear-implant surgeon. The process can be expensive. For example, a person's health insurance may cover the expense, but not always. Some individuals may choose not to have a cochlear implant for a variety of personal reasons. Surgical implantations are almost always safe, although complications are a risk factor, just as with any kind of surgery. An additional consideration is learning to interpret the sounds created by an implant. This process takes time and practice. Speech-language pathologists and audiologists are frequently involved in this learning process. Prior to implantation, all of these factors need to be considered.  Source: NIH/NIDCD

## 2025-07-24 NOTE — PROGRESS NOTES
[facility]  [address]  [place] [address]  P: [phone]       Patient     Alexis Will  1955    ChiefComplaint     Chief Complaint   Patient presents with    New Patient     Bilateral ringing in ears. Not as bad as it was. Is consistent. Denies any pain or dizziness.        History of Present Illness     Alexis Will is a pleasant 69 y.o. male who presents as a new patient for bilateral left greater than right tinnitus.  About 12-14 years ago, the patient suffered a sudden left-sided hearing loss.  He was worked up at  and told they suspected it might have been a stroke.  Patient has bilateral nonpulsatile high-frequency tinnitus, worse in the left ear.  Tinnitus did worsen around January of this year.  Hearing in the right ear is feeling muffled as well.  Denies any history of ear infections or ear surgeries otherwise.  MRI of the brain with and without contrast from 2012 images and report independently reviewed.  The IACs are clear.  However it is uncertain if this was around the time he had his hearing loss.  There is no documented audiogram within the medical record.  Denies a history of ear surgeries.  Had the right ear cleaned with audiology and states he has improvement of hearing in the right ear.  Tinnitus has improved since January.  Its improved over the past month or 2.  He does notice some difficulty hearing.    Audiogram performed in the office today reveals right sided moderate to mild sensorineural loss and left-sided moderate to mild sensorineural loss with asymmetry.  Type a tympanograms.  Good word recognition score in the right ear, 52% in the left ear.    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